# Patient Record
Sex: MALE | Race: WHITE | NOT HISPANIC OR LATINO | Employment: OTHER | URBAN - METROPOLITAN AREA
[De-identification: names, ages, dates, MRNs, and addresses within clinical notes are randomized per-mention and may not be internally consistent; named-entity substitution may affect disease eponyms.]

---

## 2018-05-10 LAB
ANION GAP SERPL CALCULATED.3IONS-SCNC: 17.8 MM/L
BUN SERPL-MCNC: 19 MG/DL (ref 7–25)
CALCIUM SERPL-MCNC: 9.7 MG/DL (ref 8.6–10.5)
CHLORIDE SERPL-SCNC: 106 MM/L (ref 98–107)
CHOLEST SERPL-MCNC: 122 MG/DL (ref 0–200)
CO2 SERPL-SCNC: 20 MM/L (ref 21–31)
CREAT SERPL-MCNC: 1.35 MG/DL (ref 0.7–1.3)
EGFR (HISTORICAL): 51 GFR
EGFR AFRICAN AMERICAN (HISTORICAL): > 60 GFR
EST. AVERAGE GLUCOSE BLD GHB EST-MCNC: 141 MG/DL
GLUCOSE (HISTORICAL): 109 MG/DL (ref 65–99)
HBA1C MFR BLD HPLC: 6.5 % (ref 4–6.2)
HDLC SERPL-MCNC: 50 MG/DL (ref 40–60)
LDLC SERPL CALC-MCNC: 48.8 MG/DL (ref 75–193)
OSMOLALITY, SERUM (HISTORICAL): 280 MOSM (ref 262–291)
POTASSIUM SERPL-SCNC: 4.8 MM/L (ref 3.5–5.5)
SODIUM SERPL-SCNC: 139 MM/L (ref 134–143)
TRIGL SERPL-MCNC: 114 MG/DL (ref 44–166)
VLDL CHOLESTEROL (HISTORICAL): 23 MG/DL (ref 5–51)

## 2018-07-19 DIAGNOSIS — E11.8 TYPE 2 DIABETES MELLITUS WITH COMPLICATION, WITH LONG-TERM CURRENT USE OF INSULIN (HCC): Primary | ICD-10-CM

## 2018-07-19 DIAGNOSIS — Z79.4 TYPE 2 DIABETES MELLITUS WITH COMPLICATION, WITH LONG-TERM CURRENT USE OF INSULIN (HCC): Primary | ICD-10-CM

## 2018-08-06 DIAGNOSIS — I10 HYPERTENSION, UNSPECIFIED TYPE: Primary | ICD-10-CM

## 2018-08-06 DIAGNOSIS — E78.5 HYPERLIPIDEMIA, UNSPECIFIED HYPERLIPIDEMIA TYPE: ICD-10-CM

## 2018-08-06 RX ORDER — SIMVASTATIN 20 MG
20 TABLET ORAL
Qty: 180 TABLET | Refills: 3 | Status: SHIPPED | OUTPATIENT
Start: 2018-08-06 | End: 2020-02-03 | Stop reason: SDUPTHER

## 2018-08-06 RX ORDER — AMLODIPINE BESYLATE 10 MG/1
10 TABLET ORAL DAILY
Qty: 180 TABLET | Refills: 3 | Status: SHIPPED | OUTPATIENT
Start: 2018-08-06 | End: 2020-02-03 | Stop reason: SDUPTHER

## 2018-08-07 PROBLEM — E11.9 DIABETES MELLITUS, TYPE II (HCC): Status: ACTIVE | Noted: 2018-08-07

## 2018-08-07 PROBLEM — I10 HYPERTENSION: Status: ACTIVE | Noted: 2018-08-07

## 2018-08-07 PROBLEM — E78.00 HIGH CHOLESTEROL: Status: ACTIVE | Noted: 2018-08-07

## 2018-08-07 RX ORDER — ASPIRIN 81 MG/1
81 TABLET ORAL DAILY
COMMUNITY

## 2018-08-07 RX ORDER — LISINOPRIL 20 MG/1
20 TABLET ORAL DAILY
COMMUNITY
End: 2019-04-03 | Stop reason: SDUPTHER

## 2019-01-14 DIAGNOSIS — Z01.00 DIABETIC EYE EXAM (HCC): Primary | ICD-10-CM

## 2019-01-14 DIAGNOSIS — E11.9 DIABETIC EYE EXAM (HCC): Primary | ICD-10-CM

## 2019-01-14 DIAGNOSIS — Z79.4 TYPE 2 DIABETES MELLITUS WITH COMPLICATION, WITH LONG-TERM CURRENT USE OF INSULIN (HCC): ICD-10-CM

## 2019-01-14 DIAGNOSIS — E11.8 TYPE 2 DIABETES MELLITUS WITH COMPLICATION, WITH LONG-TERM CURRENT USE OF INSULIN (HCC): ICD-10-CM

## 2019-01-15 ENCOUNTER — OFFICE VISIT (OUTPATIENT)
Dept: FAMILY MEDICINE CLINIC | Facility: CLINIC | Age: 84
End: 2019-01-15
Payer: MEDICARE

## 2019-01-15 VITALS
HEIGHT: 65 IN | TEMPERATURE: 99.2 F | SYSTOLIC BLOOD PRESSURE: 140 MMHG | HEART RATE: 84 BPM | BODY MASS INDEX: 25.66 KG/M2 | DIASTOLIC BLOOD PRESSURE: 72 MMHG | RESPIRATION RATE: 16 BRPM | OXYGEN SATURATION: 98 % | WEIGHT: 154 LBS

## 2019-01-15 DIAGNOSIS — I10 ESSENTIAL HYPERTENSION: Primary | ICD-10-CM

## 2019-01-15 DIAGNOSIS — E78.00 HIGH CHOLESTEROL: ICD-10-CM

## 2019-01-15 DIAGNOSIS — Z13.0 SCREENING, ANEMIA, DEFICIENCY, IRON: Primary | ICD-10-CM

## 2019-01-15 DIAGNOSIS — E11.9 TYPE 2 DIABETES MELLITUS WITHOUT COMPLICATION, WITHOUT LONG-TERM CURRENT USE OF INSULIN (HCC): ICD-10-CM

## 2019-01-15 DIAGNOSIS — R53.83 FATIGUE, UNSPECIFIED TYPE: ICD-10-CM

## 2019-01-15 PROCEDURE — 99214 OFFICE O/P EST MOD 30 MIN: CPT | Performed by: INTERNAL MEDICINE

## 2019-01-15 NOTE — PROGRESS NOTES
Assessment/Plan: This is a follow-up visit for 43-year-old male with background history of hypertension, type 2 diabetes with adequate control last A1c 6 2  Hyperlipidemia on statin therapy  History of mild kidney dysfunction class creatinine 1 35  Patient's medications were all reviewed and renewed  Will monitor CBC Chem 7 A1c and lipid profile urine for microalbumin  Discuss the need for flu and pneumonia immunization but patient refused  Discuss the need for follow-up colonoscopy but patient declined  Recheck and follow-up scheduled for 3         Diagnoses and all orders for this visit:    Essential hypertension  -     Basic metabolic panel; Future    High cholesterol  -     Lipid panel; Future    Type 2 diabetes mellitus without complication, without long-term current use of insulin (HCC)  -     Urinalysis with reflex to microscopic  -     CBC and differential; Future    Other orders  -     Cancel: C-peptide; Future  -     Cancel: Glucose, fasting; Future          Subjective:      Patient ID: Ludwin Rosales is a 82 y o  male  43-year-old male with background history of hypertension type 2 diabetes and hyperlipidemia  Patient comes after an absence of almost 9 months because he had moved to [place] for few months he is back now for a checkup  Patient is generally feeling fine he has no specific complaints denies any chest pain dyspnea or palpitation  Past history is negative for coronary artery disease  Colorectal screening was done 10 years ago and since then he has refused to go for follow-up  Patient had no new complaints at this time  The following portions of the patient's history were reviewed and updated as appropriate: He  has a past medical history of Aortic aneurysm ([facility] ); Back pain; Carotid stenosis; and Prostate cancer ([facility] )    Patient Active Problem List    Diagnosis Date Noted    Diabetes mellitus, type II ([facility] ) 08/07/2018    Hypertension 08/07/2018    High cholesterol 08/07/2018     He  has a past surgical history that includes Thoracic aortic aneurysm repair and Other surgical history (2005)  His family history includes Cancer in his family; Diabetes in his family  He  reports that he has never smoked  He has never used smokeless tobacco  He reports that he drinks alcohol  He reports that he does not use drugs  Current Outpatient Prescriptions   Medication Sig Dispense Refill    amLODIPine (NORVASC) 10 mg tablet Take 1 tablet (10 mg total) by mouth daily 180 tablet 3    aspirin (ECOTRIN LOW STRENGTH) 81 mg EC tablet Take 81 mg by mouth daily      CINNAMON PO Take by mouth daily      lisinopril (ZESTRIL) 20 mg tablet Take 20 mg by mouth daily      metFORMIN (GLUCOPHAGE) 500 mg tablet Take 1 tablet (500 mg total) by mouth 2 (two) times a day with meals 180 tablet 2    simvastatin (ZOCOR) 20 mg tablet Take 1 tablet (20 mg total) by mouth daily at bedtime 180 tablet 3     No current facility-administered medications for this visit  Current Outpatient Prescriptions on File Prior to Visit   Medication Sig    amLODIPine (NORVASC) 10 mg tablet Take 1 tablet (10 mg total) by mouth daily    aspirin (ECOTRIN LOW STRENGTH) 81 mg EC tablet Take 81 mg by mouth daily    CINNAMON PO Take by mouth daily    lisinopril (ZESTRIL) 20 mg tablet Take 20 mg by mouth daily    metFORMIN (GLUCOPHAGE) 500 mg tablet Take 1 tablet (500 mg total) by mouth 2 (two) times a day with meals    simvastatin (ZOCOR) 20 mg tablet Take 1 tablet (20 mg total) by mouth daily at bedtime     No current facility-administered medications on file prior to visit  He has No Known Allergies       Review of Systems   Constitutional: Negative  HENT: Negative  Eyes: Negative  Respiratory: Negative  Cardiovascular: Negative  Gastrointestinal: Negative  Endocrine: Negative  Genitourinary: Negative  Musculoskeletal: Negative  Skin: Negative      Allergic/Immunologic: Negative  Neurological: Negative  Hematological: Negative  Psychiatric/Behavioral: Negative  Objective:      /72 (BP Location: Right arm, Patient Position: Sitting, Cuff Size: Adult)   Pulse 84   Temp 99 2 °F (37 3 °C) (Tympanic)   Resp 16   Ht 5' 5" (1 651 m)   Wt 69 9 kg (154 lb)   SpO2 98%   BMI 25 63 kg/m²          Physical Exam   Constitutional: He is oriented to person, place, and time  He appears well-developed and well-nourished  HENT:   Head: Normocephalic  Eyes: Pupils are equal, round, and reactive to light  Neck: Normal range of motion  Neck supple  No JVD present  No tracheal deviation present  No thyromegaly present  Cardiovascular: Normal rate, regular rhythm, normal heart sounds and intact distal pulses  Exam reveals no gallop and no friction rub  No murmur heard  Pulmonary/Chest: Effort normal and breath sounds normal    Abdominal: Soft  Bowel sounds are normal    Musculoskeletal: Normal range of motion  Neurological: He is alert and oriented to person, place, and time  Skin: Skin is warm  No visits with results within 4 Month(s) from this visit  Latest known visit with results is:   Orders Only on 05/10/2018   Component Date Value Ref Range Status    Glucose 05/10/2018 109* 65 - 99 MG/DL Final    Comment: ADA fasting glucose recommendations:   Normal: 65-99; Impaired: 100-125;  Diagnostic for Diabetes mellitus: > 125; Target for Diabetes mellitus:       BUN 05/10/2018 19  7 - 25 MG/DL Final    Creatinine 05/10/2018 1 35* 0 7 - 1 3 MG/DL Final    Comment: IDMS method performed  The drugs Metamizole, Sulfasalazine, and Sulfapyridine may interfere and  result in false low results   Sodium 05/10/2018 139  134 - 143 MM/L Final    Please note: Reference range change based on patient population               Potassium 05/10/2018 4 8  3 5 - 5 5 MM/L Final    Please note: Reference range change based on patient population   Chloride 05/10/2018 106  98 - 107 MM/L Final    CO2 05/10/2018 20* 21 0 - 31 0 MM/L Final    Calcium 05/10/2018 9 7  8 6 - 10 5 MG/DL Final    Please note: Reference range change based on patient population   Anion Gap 05/10/2018 17 8  MM/L Final    OSMOLALITY, SERUM 05/10/2018 280  262 - 291 mOsm Final    EGFR (HISTORICAL) 05/10/2018 51* >60 GFR Final    Comment: This calculation follows the MDRD guidelines  Units are in mL/min/1 73 sq meters  Stage 3a  Reference Range -  45-59 mL/min  Mild to moderate decrease in kidney function      eGFR  05/10/2018 > 60  >60 GFR Final    Cholesterol 05/10/2018 122  0 - 200 MG/DL Final    Comment: <200----------------------------DESIRABLE  200 - 239---------------------BORDERLINE HIGH  240 OR GREATER-----HIGH  The drugs Metamizole, Sulfasalazine, and Sulfapyridine may interfere and  result in false low results   Triglycerides 05/10/2018 114  44 - 166 MG/DL Final    Comment: The drugs Metamizole, Sulfasalazine, and Sulfapyridine may interfere and  result in false low results   VLDL CHOLESTEROL 05/10/2018 23 0  5 - 51 MG/DL Final    HDL 05/10/2018 50  40 - 60 MG/DL Final    Comment: The drugs Metamizole, Sulfasalazine, and Sulfapyridine may interfere and  result in false low results   LDL Calculated 05/10/2018 48 8* 75 - 193 MG/DL Final    Hemoglobin A1C 05/10/2018 6 5* 4 0 - 6 2 % Final    Comment: >8% - POOR BLOOD GLUCOSE CONTROL   7% - GOOD BLOOD GLUCOSE CONTROL  <7% - EXCELLENT BLOOD GLUCOSE CONTROL      EAG 05/10/2018 141   Final       No results found

## 2019-04-03 DIAGNOSIS — I10 ESSENTIAL HYPERTENSION: Primary | ICD-10-CM

## 2019-04-03 RX ORDER — LISINOPRIL 20 MG/1
20 TABLET ORAL DAILY
Qty: 180 TABLET | Refills: 1 | Status: SHIPPED | OUTPATIENT
Start: 2019-04-03 | End: 2020-04-01 | Stop reason: SDUPTHER

## 2019-04-16 ENCOUNTER — OFFICE VISIT (OUTPATIENT)
Dept: FAMILY MEDICINE CLINIC | Facility: CLINIC | Age: 84
End: 2019-04-16
Payer: MEDICARE

## 2019-04-16 VITALS
HEART RATE: 80 BPM | BODY MASS INDEX: 25.43 KG/M2 | DIASTOLIC BLOOD PRESSURE: 80 MMHG | HEIGHT: 65 IN | RESPIRATION RATE: 18 BRPM | WEIGHT: 152.6 LBS | TEMPERATURE: 98.9 F | SYSTOLIC BLOOD PRESSURE: 162 MMHG | OXYGEN SATURATION: 98 %

## 2019-04-16 DIAGNOSIS — E78.00 HIGH CHOLESTEROL: ICD-10-CM

## 2019-04-16 DIAGNOSIS — Z00.01 ENCOUNTER FOR ANNUAL GENERAL MEDICAL EXAMINATION WITH ABNORMAL FINDINGS IN ADULT: Primary | ICD-10-CM

## 2019-04-16 DIAGNOSIS — I10 ESSENTIAL HYPERTENSION: ICD-10-CM

## 2019-04-16 DIAGNOSIS — E11.8 TYPE 2 DIABETES MELLITUS WITH COMPLICATION, WITHOUT LONG-TERM CURRENT USE OF INSULIN (HCC): ICD-10-CM

## 2019-04-16 PROCEDURE — 99213 OFFICE O/P EST LOW 20 MIN: CPT | Performed by: INTERNAL MEDICINE

## 2019-04-16 PROCEDURE — G0439 PPPS, SUBSEQ VISIT: HCPCS | Performed by: INTERNAL MEDICINE

## 2019-06-24 LAB
LEFT EYE DIABETIC RETINOPATHY: NORMAL
RIGHT EYE DIABETIC RETINOPATHY: NORMAL

## 2019-08-15 LAB — HBA1C MFR BLD HPLC: 6.6 %

## 2019-08-22 ENCOUNTER — OFFICE VISIT (OUTPATIENT)
Dept: FAMILY MEDICINE CLINIC | Facility: CLINIC | Age: 84
End: 2019-08-22
Payer: MEDICARE

## 2019-08-22 VITALS
BODY MASS INDEX: 25.33 KG/M2 | OXYGEN SATURATION: 98 % | TEMPERATURE: 99.3 F | HEART RATE: 85 BPM | RESPIRATION RATE: 18 BRPM | HEIGHT: 65 IN | WEIGHT: 152 LBS | DIASTOLIC BLOOD PRESSURE: 84 MMHG | SYSTOLIC BLOOD PRESSURE: 162 MMHG

## 2019-08-22 DIAGNOSIS — E11.8 TYPE 2 DIABETES MELLITUS WITH COMPLICATION, WITHOUT LONG-TERM CURRENT USE OF INSULIN (HCC): Primary | ICD-10-CM

## 2019-08-22 DIAGNOSIS — I10 ESSENTIAL HYPERTENSION: ICD-10-CM

## 2019-08-22 DIAGNOSIS — E66.3 OVERWEIGHT (BMI 25.0-29.9): ICD-10-CM

## 2019-08-22 PROCEDURE — 99214 OFFICE O/P EST MOD 30 MIN: CPT | Performed by: FAMILY MEDICINE

## 2019-08-22 NOTE — PROGRESS NOTES
Assessment/Plan:    No problem-specific Assessment & Plan notes found for this encounter  Diagnoses and all orders for this visit:    Type 2 diabetes mellitus with complication, without long-term current use of insulin (HCC)  -     Microalbumin / creatinine urine ratio  -     HEMOGLOBIN A1C W/ EAG ESTIMATION; Future  -     C-peptide; Future  -     Glucose, fasting; Future  -     Hemoglobin A1C; Future    Essential hypertension    Overweight (BMI 25 0-29  9)  Comments:  pt counseled on diet and exercise          PHQ-9 Depression Screening    PHQ-9:    Frequency of the following problems over the past two weeks:             BMI Counseling: Body mass index is 25 29 kg/m²  Discussed the patient's BMI with him  The BMI is above average  BMI counseling and education was provided to the patient  Nutrition recommendations include reducing portion sizes and 3-5 servings of fruits/vegetables daily  Exercise recommendations include moderate aerobic physical activity for 150 minutes/week and exercising 3-5 times per week  Subjective:      Patient ID: Anoop Timmons is a 82 y o  male  Follow up for HTN, pt is taking amlodipine and lisinopril    Diabetes   He presents for his follow-up diabetic visit  He has type 2 diabetes mellitus  His disease course has been stable  There are no hypoglycemic associated symptoms  There are no diabetic associated symptoms  Pertinent negatives for diabetes include no chest pain  There are no hypoglycemic complications  Risk factors for coronary artery disease include male sex and sedentary lifestyle  Current diabetic treatment includes oral agent (monotherapy)  He is compliant with treatment most of the time  His weight is stable  He is following a diabetic diet  (Pt is not checking BS at home) An ACE inhibitor/angiotensin II receptor blocker is being taken         The following portions of the patient's history were reviewed and updated as appropriate: allergies, current medications, past family history, past medical history, past social history, past surgical history and problem list     Review of Systems   Eyes: Negative for visual disturbance  Cardiovascular: Negative for chest pain and palpitations  Gastrointestinal: Negative for abdominal pain, nausea and vomiting  Genitourinary: Positive for frequency  Skin: Negative for wound  Neurological: Negative for numbness  Objective:    /84   Pulse 85   Temp 99 3 °F (37 4 °C)   Resp 18   Ht 5' 5" (1 651 m)   Wt 68 9 kg (152 lb)   SpO2 98%   BMI 25 29 kg/m²      Physical Exam   Constitutional: He is oriented to person, place, and time  He appears well-developed and well-nourished  HENT:   Head: Normocephalic and atraumatic  Eyes: Pupils are equal, round, and reactive to light  Conjunctivae and EOM are normal  No scleral icterus  Neck: Normal range of motion  Neck supple  Cardiovascular: Normal rate, regular rhythm and normal heart sounds  No murmur heard  Pulmonary/Chest: Effort normal and breath sounds normal  No respiratory distress  He has no wheezes  He has no rales  Abdominal: Soft  Bowel sounds are normal  He exhibits no distension and no mass  There is no tenderness  There is no rebound and no guarding  Musculoskeletal: He exhibits no edema or deformity  Lymphadenopathy:     He has no cervical adenopathy  Neurological: He is alert and oriented to person, place, and time  Skin: Skin is warm and dry  Psychiatric: He has a normal mood and affect  His behavior is normal  Judgment and thought content normal    Nursing note and vitals reviewed

## 2019-10-11 DIAGNOSIS — Z79.4 TYPE 2 DIABETES MELLITUS WITH COMPLICATION, WITH LONG-TERM CURRENT USE OF INSULIN (HCC): ICD-10-CM

## 2019-10-11 DIAGNOSIS — E11.8 TYPE 2 DIABETES MELLITUS WITH COMPLICATION, WITH LONG-TERM CURRENT USE OF INSULIN (HCC): ICD-10-CM

## 2019-11-14 LAB — HBA1C MFR BLD HPLC: 6.4 %

## 2019-11-21 ENCOUNTER — OFFICE VISIT (OUTPATIENT)
Dept: FAMILY MEDICINE CLINIC | Facility: CLINIC | Age: 84
End: 2019-11-21
Payer: MEDICARE

## 2019-11-21 VITALS
DIASTOLIC BLOOD PRESSURE: 85 MMHG | HEIGHT: 65 IN | WEIGHT: 152 LBS | BODY MASS INDEX: 25.33 KG/M2 | TEMPERATURE: 98.8 F | RESPIRATION RATE: 20 BRPM | HEART RATE: 84 BPM | OXYGEN SATURATION: 98 % | SYSTOLIC BLOOD PRESSURE: 138 MMHG

## 2019-11-21 DIAGNOSIS — Z12.5 SCREENING FOR PROSTATE CANCER: ICD-10-CM

## 2019-11-21 DIAGNOSIS — E78.00 HYPERCHOLESTEROLEMIA: ICD-10-CM

## 2019-11-21 DIAGNOSIS — E11.9 TYPE 2 DIABETES MELLITUS WITHOUT COMPLICATION, WITHOUT LONG-TERM CURRENT USE OF INSULIN (HCC): Primary | ICD-10-CM

## 2019-11-21 DIAGNOSIS — I10 ESSENTIAL HYPERTENSION: ICD-10-CM

## 2019-11-21 PROCEDURE — 99214 OFFICE O/P EST MOD 30 MIN: CPT | Performed by: FAMILY MEDICINE

## 2019-11-21 NOTE — PROGRESS NOTES
Assessment/Plan:    No problem-specific Assessment & Plan notes found for this encounter  Diagnoses and all orders for this visit:    Type 2 diabetes mellitus without complication, without long-term current use of insulin (HCC)  -     HEMOGLOBIN A1C W/ EAG ESTIMATION; Future  -     C-peptide; Future  -     Glucose, fasting; Future    Essential hypertension  -     CBC and differential; Future  -     TSH, 3rd generation with Free T4 reflex; Future    Screening for prostate cancer  -     PSA, Total Screen; Future    Hypercholesterolemia  -     Comprehensive metabolic panel; Future  -     Lipid panel; Future    Other orders  -     Cancel: Hemoglobin A1C; Future          PHQ-9 Depression Screening    PHQ-9:    Frequency of the following problems over the past two weeks:                 Subjective:      Patient ID: Jessica Eubanks is a 82 y o  male  Follow up for up HTN, pt is checking Bps at home and seeing good numbers    Diabetes   He presents for his follow-up diabetic visit  He has type 2 diabetes mellitus  His disease course has been stable  There are no hypoglycemic associated symptoms  There are no diabetic associated symptoms  Pertinent negatives for diabetes include no chest pain  There are no hypoglycemic complications  Symptoms are stable  There are no diabetic complications  Current diabetic treatment includes oral agent (monotherapy)  He is compliant with treatment most of the time  The following portions of the patient's history were reviewed and updated as appropriate: allergies, current medications, past family history, past medical history, past social history, past surgical history and problem list     Review of Systems   Eyes: Negative for visual disturbance  Cardiovascular: Negative for chest pain and palpitations  Gastrointestinal: Negative for abdominal pain, nausea and vomiting  Genitourinary: Negative for frequency  Skin: Negative for wound     Neurological: Negative for numbness  Objective:    /85   Pulse 84   Temp 98 8 °F (37 1 °C) (Tympanic)   Resp 20   Ht 5' 5" (1 651 m)   Wt 68 9 kg (152 lb)   SpO2 98%   BMI 25 29 kg/m²      Physical Exam   Constitutional: He is oriented to person, place, and time  He appears well-developed and well-nourished  HENT:   Head: Normocephalic and atraumatic  Eyes: Pupils are equal, round, and reactive to light  Conjunctivae and EOM are normal  No scleral icterus  Neck: Normal range of motion  Neck supple  Cardiovascular: Normal rate, regular rhythm and normal heart sounds  No murmur heard  Pulmonary/Chest: Effort normal and breath sounds normal  No respiratory distress  He has no wheezes  He has no rales  Abdominal: Soft  Bowel sounds are normal  He exhibits no distension and no mass  There is no tenderness  There is no rebound and no guarding  Musculoskeletal: He exhibits no edema or deformity  Lymphadenopathy:     He has no cervical adenopathy  Neurological: He is alert and oriented to person, place, and time  Skin: Skin is warm and dry  Psychiatric: He has a normal mood and affect  His behavior is normal  Judgment and thought content normal    Nursing note and vitals reviewed

## 2020-02-03 DIAGNOSIS — I10 HYPERTENSION, UNSPECIFIED TYPE: ICD-10-CM

## 2020-02-03 DIAGNOSIS — E78.5 HYPERLIPIDEMIA, UNSPECIFIED HYPERLIPIDEMIA TYPE: ICD-10-CM

## 2020-02-05 RX ORDER — AMLODIPINE BESYLATE 10 MG/1
10 TABLET ORAL DAILY
Qty: 90 TABLET | Refills: 1 | Status: SHIPPED | OUTPATIENT
Start: 2020-02-05 | End: 2020-08-03

## 2020-02-05 RX ORDER — SIMVASTATIN 20 MG
20 TABLET ORAL
Qty: 90 TABLET | Refills: 1 | Status: SHIPPED | OUTPATIENT
Start: 2020-02-05 | End: 2020-08-03

## 2020-04-01 DIAGNOSIS — I10 ESSENTIAL HYPERTENSION: ICD-10-CM

## 2020-04-02 RX ORDER — LISINOPRIL 20 MG/1
20 TABLET ORAL DAILY
Qty: 180 TABLET | Refills: 1 | Status: SHIPPED | OUTPATIENT
Start: 2020-04-02 | End: 2020-09-30 | Stop reason: SDUPTHER

## 2020-04-23 LAB — HBA1C MFR BLD HPLC: 6.6 %

## 2020-06-18 ENCOUNTER — OFFICE VISIT (OUTPATIENT)
Dept: FAMILY MEDICINE CLINIC | Facility: CLINIC | Age: 85
End: 2020-06-18
Payer: MEDICARE

## 2020-06-18 VITALS
TEMPERATURE: 98.5 F | DIASTOLIC BLOOD PRESSURE: 68 MMHG | HEIGHT: 65 IN | BODY MASS INDEX: 25.33 KG/M2 | OXYGEN SATURATION: 95 % | SYSTOLIC BLOOD PRESSURE: 138 MMHG | HEART RATE: 71 BPM | WEIGHT: 152 LBS | RESPIRATION RATE: 18 BRPM

## 2020-06-18 DIAGNOSIS — Z13.31 NEGATIVE DEPRESSION SCREENING: ICD-10-CM

## 2020-06-18 DIAGNOSIS — E66.3 OVERWEIGHT: ICD-10-CM

## 2020-06-18 DIAGNOSIS — I10 ESSENTIAL HYPERTENSION: ICD-10-CM

## 2020-06-18 DIAGNOSIS — E78.00 HYPERCHOLESTEROLEMIA: ICD-10-CM

## 2020-06-18 DIAGNOSIS — Z00.00 MEDICARE ANNUAL WELLNESS VISIT, SUBSEQUENT: Primary | ICD-10-CM

## 2020-06-18 DIAGNOSIS — E11.9 TYPE 2 DIABETES MELLITUS WITHOUT COMPLICATION, WITHOUT LONG-TERM CURRENT USE OF INSULIN (HCC): ICD-10-CM

## 2020-06-18 PROCEDURE — 99214 OFFICE O/P EST MOD 30 MIN: CPT | Performed by: FAMILY MEDICINE

## 2020-06-18 PROCEDURE — 3078F DIAST BP <80 MM HG: CPT | Performed by: FAMILY MEDICINE

## 2020-06-18 PROCEDURE — 2022F DILAT RTA XM EVC RTNOPTHY: CPT | Performed by: FAMILY MEDICINE

## 2020-06-18 PROCEDURE — 1036F TOBACCO NON-USER: CPT | Performed by: FAMILY MEDICINE

## 2020-06-18 PROCEDURE — 1125F AMNT PAIN NOTED PAIN PRSNT: CPT | Performed by: FAMILY MEDICINE

## 2020-06-18 PROCEDURE — 3008F BODY MASS INDEX DOCD: CPT | Performed by: FAMILY MEDICINE

## 2020-06-18 PROCEDURE — 3044F HG A1C LEVEL LT 7.0%: CPT | Performed by: FAMILY MEDICINE

## 2020-06-18 PROCEDURE — 1160F RVW MEDS BY RX/DR IN RCRD: CPT | Performed by: FAMILY MEDICINE

## 2020-06-18 PROCEDURE — 3075F SYST BP GE 130 - 139MM HG: CPT | Performed by: FAMILY MEDICINE

## 2020-06-18 PROCEDURE — G0439 PPPS, SUBSEQ VISIT: HCPCS | Performed by: FAMILY MEDICINE

## 2020-06-18 PROCEDURE — 1170F FXNL STATUS ASSESSED: CPT | Performed by: FAMILY MEDICINE

## 2020-07-07 DIAGNOSIS — E11.8 TYPE 2 DIABETES MELLITUS WITH COMPLICATION, WITH LONG-TERM CURRENT USE OF INSULIN (HCC): ICD-10-CM

## 2020-07-07 DIAGNOSIS — Z79.4 TYPE 2 DIABETES MELLITUS WITH COMPLICATION, WITH LONG-TERM CURRENT USE OF INSULIN (HCC): ICD-10-CM

## 2020-08-03 DIAGNOSIS — E78.5 HYPERLIPIDEMIA, UNSPECIFIED HYPERLIPIDEMIA TYPE: ICD-10-CM

## 2020-08-03 DIAGNOSIS — I10 HYPERTENSION, UNSPECIFIED TYPE: ICD-10-CM

## 2020-08-03 RX ORDER — AMLODIPINE BESYLATE 10 MG/1
TABLET ORAL
Qty: 90 TABLET | Refills: 0 | Status: SHIPPED | OUTPATIENT
Start: 2020-08-03 | End: 2020-11-03 | Stop reason: SDUPTHER

## 2020-08-03 RX ORDER — SIMVASTATIN 20 MG
TABLET ORAL
Qty: 90 TABLET | Refills: 0 | Status: SHIPPED | OUTPATIENT
Start: 2020-08-03 | End: 2020-11-03 | Stop reason: SDUPTHER

## 2020-09-30 DIAGNOSIS — I10 ESSENTIAL HYPERTENSION: ICD-10-CM

## 2020-10-01 RX ORDER — LISINOPRIL 20 MG/1
20 TABLET ORAL DAILY
Qty: 180 TABLET | Refills: 1 | Status: SHIPPED | OUTPATIENT
Start: 2020-10-01 | End: 2021-09-21 | Stop reason: SDUPTHER

## 2020-11-03 DIAGNOSIS — I10 HYPERTENSION, UNSPECIFIED TYPE: ICD-10-CM

## 2020-11-03 DIAGNOSIS — E78.5 HYPERLIPIDEMIA, UNSPECIFIED HYPERLIPIDEMIA TYPE: ICD-10-CM

## 2020-11-03 RX ORDER — AMLODIPINE BESYLATE 10 MG/1
10 TABLET ORAL DAILY
Qty: 90 TABLET | Refills: 3 | Status: SHIPPED | OUTPATIENT
Start: 2020-11-03 | End: 2021-11-01 | Stop reason: SDUPTHER

## 2020-11-03 RX ORDER — SIMVASTATIN 20 MG
20 TABLET ORAL
Qty: 90 TABLET | Refills: 3 | Status: SHIPPED | OUTPATIENT
Start: 2020-11-03 | End: 2021-11-01 | Stop reason: SDUPTHER

## 2020-11-10 LAB
LEFT EYE DIABETIC RETINOPATHY: NORMAL
RIGHT EYE DIABETIC RETINOPATHY: NORMAL

## 2020-12-30 ENCOUNTER — LAB (OUTPATIENT)
Dept: LAB | Facility: CLINIC | Age: 85
End: 2020-12-30
Payer: MEDICARE

## 2020-12-30 DIAGNOSIS — E11.9 TYPE 2 DIABETES MELLITUS WITHOUT COMPLICATION, WITHOUT LONG-TERM CURRENT USE OF INSULIN (HCC): ICD-10-CM

## 2020-12-30 LAB
EST. AVERAGE GLUCOSE BLD GHB EST-MCNC: 134 MG/DL
GLUCOSE P FAST SERPL-MCNC: 128 MG/DL (ref 65–99)
HBA1C MFR BLD: 6.3 %

## 2020-12-30 PROCEDURE — 84681 ASSAY OF C-PEPTIDE: CPT

## 2020-12-30 PROCEDURE — 82947 ASSAY GLUCOSE BLOOD QUANT: CPT

## 2020-12-30 PROCEDURE — 36415 COLL VENOUS BLD VENIPUNCTURE: CPT

## 2020-12-30 PROCEDURE — 83036 HEMOGLOBIN GLYCOSYLATED A1C: CPT

## 2020-12-31 LAB — C PEPTIDE SERPL-MCNC: 4.2 NG/ML (ref 1.1–4.4)

## 2021-01-07 ENCOUNTER — OFFICE VISIT (OUTPATIENT)
Dept: FAMILY MEDICINE CLINIC | Facility: CLINIC | Age: 86
End: 2021-01-07
Payer: MEDICARE

## 2021-01-07 VITALS
SYSTOLIC BLOOD PRESSURE: 138 MMHG | BODY MASS INDEX: 25.79 KG/M2 | TEMPERATURE: 97.9 F | WEIGHT: 154.8 LBS | HEIGHT: 65 IN | OXYGEN SATURATION: 95 % | DIASTOLIC BLOOD PRESSURE: 78 MMHG | HEART RATE: 104 BPM

## 2021-01-07 DIAGNOSIS — E78.00 HIGH CHOLESTEROL: ICD-10-CM

## 2021-01-07 DIAGNOSIS — Z12.5 SCREENING FOR PROSTATE CANCER: ICD-10-CM

## 2021-01-07 DIAGNOSIS — E11.9 ENCOUNTER FOR DIABETIC FOOT EXAM (HCC): ICD-10-CM

## 2021-01-07 DIAGNOSIS — E66.3 OVERWEIGHT (BMI 25.0-29.9): ICD-10-CM

## 2021-01-07 DIAGNOSIS — Z13.31 NEGATIVE DEPRESSION SCREENING: ICD-10-CM

## 2021-01-07 DIAGNOSIS — E11.9 TYPE 2 DIABETES MELLITUS WITHOUT COMPLICATION, WITHOUT LONG-TERM CURRENT USE OF INSULIN (HCC): Primary | ICD-10-CM

## 2021-01-07 DIAGNOSIS — I10 ESSENTIAL HYPERTENSION: ICD-10-CM

## 2021-01-07 DIAGNOSIS — Z23 ENCOUNTER FOR IMMUNIZATION: ICD-10-CM

## 2021-01-07 PROCEDURE — 99214 OFFICE O/P EST MOD 30 MIN: CPT | Performed by: FAMILY MEDICINE

## 2021-01-07 NOTE — PROGRESS NOTES
Assessment/Plan:    No problem-specific Assessment & Plan notes found for this encounter  Diagnoses and all orders for this visit:    Type 2 diabetes mellitus without complication, without long-term current use of insulin ([facility])  Comments:  well controlled  Orders:  -     Glucose, fasting; Future  -     Hemoglobin A1C; Future    Essential hypertension  Comments:  no change in the medication  Orders:  -     CBC and differential; Future  -     TSH, 3rd generation with Free T4 reflex; Future    [facility]ounter [facility] exam [facility] )  -     Diabetic foot exam; Future    Negative depression screening    Overweight (BMI 25 0-29  9)    Encounter for immunization  -     PNEUMOCOCCAL CONJUGATE VACCINE 13-VALENT GREATER THAN 6 MONTHS    Screening for prostate cancer  -     PSA, Total Screen; Future    High cholesterol  -     Comprehensive metabolic panel; Future  -     Lipid panel; Future    Other orders  -     Cancel: influenza vaccine, high-dose, PF 0 7 mL (FLUZONE HIGH-DOSE)          PHQ-9 Depression Screening    PHQ-9:   Frequency of the following problems over the past two weeks:      Little interest or pleasure in doing things: 0 - not at all  Feeling down, depressed, or hopeless: 0 - not at all  PHQ-2 Score: 0        BMI Counseling: Body mass index is 25 76 kg/m²  The BMI is above normal  Nutrition recommendations include reducing portion sizes and 3-5 servings of fruits/vegetables daily  Exercise recommendations include moderate aerobic physical activity for 150 minutes/week and exercising 3-5 times per week  Subjective:      Patient ID: Jared Rodriguez is a 82 y o  male  Follow up for HTN    Diabetes  He presents for his follow-up diabetic visit  He has type 2 diabetes mellitus  His disease course has been stable  There are no hypoglycemic associated symptoms  There are no diabetic associated symptoms  Pertinent negatives for diabetes include no chest pain  Symptoms are stable         The following portions of the patient's history were reviewed and updated as appropriate: allergies, current medications, past family history, past medical history, past social history, past surgical history and problem list     Review of Systems   Eyes: Negative for visual disturbance  Cardiovascular: Negative for chest pain and palpitations  Gastrointestinal: Negative for abdominal pain, nausea and vomiting  Genitourinary: Positive for frequency  Skin: Negative for wound  Neurological: Negative for numbness  Objective:    /78   Pulse 104   Temp 97 9 °F (36 6 °C)   Ht 5' 5" (1 651 m)   Wt 70 2 kg (154 lb 12 8 oz)   SpO2 95%   BMI 25 76 kg/m²      Physical Exam  Vitals signs and nursing note reviewed  Constitutional:       General: He is not in acute distress  Appearance: Normal appearance  He is well-developed  He is not ill-appearing, toxic-appearing or diaphoretic  HENT:      Head: Normocephalic and atraumatic  Right Ear: External ear normal       Left Ear: External ear normal       Nose: Nose normal       Mouth/Throat:      Mouth: Mucous membranes are moist    Eyes:      General: No scleral icterus  Conjunctiva/sclera: Conjunctivae normal       Pupils: Pupils are equal, round, and reactive to light  Neck:      Musculoskeletal: Normal range of motion and neck supple  No neck rigidity  Cardiovascular:      Rate and Rhythm: Normal rate and regular rhythm  Pulses: Normal pulses  no weak pulses          Dorsalis pedis pulses are 2+ on the right side and 2+ on the left side  Heart sounds: Normal heart sounds  No murmur  Pulmonary:      Effort: Pulmonary effort is normal  No respiratory distress  Breath sounds: Normal breath sounds  No wheezing or rales  Abdominal:      General: Bowel sounds are normal  There is no distension  Palpations: Abdomen is soft  There is no mass  Tenderness: There is no abdominal tenderness  There is no guarding or rebound  Musculoskeletal:         General: No deformity  Right lower leg: No edema  Left lower leg: No edema  Feet:      Right foot:      Skin integrity: No ulcer, skin breakdown, erythema, warmth, callus or dry skin  Left foot:      Skin integrity: No ulcer, skin breakdown, erythema, warmth, callus or dry skin  Lymphadenopathy:      Cervical: No cervical adenopathy  Skin:     General: Skin is warm and dry  Neurological:      General: No focal deficit present  Mental Status: He is alert and oriented to person, place, and time  Sensory: No sensory deficit  Motor: No weakness  Coordination: Coordination normal       Gait: Gait normal    Psychiatric:         Mood and Affect: Mood normal          Behavior: Behavior normal          Thought Content: Thought content normal          Judgment: Judgment normal        Patient's shoes and socks removed  Right Foot/Ankle   Right Foot Inspection  Skin Exam: skin normal and skin intact no dry skin, no warmth, no callus, no erythema, no maceration, no abnormal color, no pre-ulcer, no ulcer and no callus                          Toe Exam:  no right toe deformity  Sensory       Monofilament testing: intact  Vascular  Capillary refills: elevated  The right DP pulse is 2+  Left Foot/Ankle  Left Foot Inspection  Skin Exam: skin normal and skin intactno dry skin, no warmth, no erythema, no maceration, normal color, no pre-ulcer, no ulcer and no callus                         Toe Exam: no left toe deformity                   Sensory       Monofilament: intact  Vascular  Capillary refills: elevated  The left DP pulse is 2+  Assign Risk Category:  No deformity present; No loss of protective sensation;  No weak pulses       Risk: 0

## 2021-04-06 DIAGNOSIS — E11.8 TYPE 2 DIABETES MELLITUS WITH COMPLICATION, WITH LONG-TERM CURRENT USE OF INSULIN (HCC): ICD-10-CM

## 2021-04-06 DIAGNOSIS — Z79.4 TYPE 2 DIABETES MELLITUS WITH COMPLICATION, WITH LONG-TERM CURRENT USE OF INSULIN (HCC): ICD-10-CM

## 2021-07-12 ENCOUNTER — APPOINTMENT (OUTPATIENT)
Dept: LAB | Facility: CLINIC | Age: 86
End: 2021-07-12
Payer: MEDICARE

## 2021-07-12 DIAGNOSIS — E11.9 TYPE 2 DIABETES MELLITUS WITHOUT COMPLICATION, WITHOUT LONG-TERM CURRENT USE OF INSULIN (HCC): ICD-10-CM

## 2021-07-12 DIAGNOSIS — E78.00 HIGH CHOLESTEROL: ICD-10-CM

## 2021-07-12 DIAGNOSIS — Z12.5 SCREENING FOR PROSTATE CANCER: ICD-10-CM

## 2021-07-12 DIAGNOSIS — I10 ESSENTIAL HYPERTENSION: ICD-10-CM

## 2021-07-12 LAB
ALBUMIN SERPL BCP-MCNC: 3.7 G/DL (ref 3.5–5)
ALP SERPL-CCNC: 53 U/L (ref 46–116)
ALT SERPL W P-5'-P-CCNC: 20 U/L (ref 12–78)
ANION GAP SERPL CALCULATED.3IONS-SCNC: 11 MMOL/L (ref 4–13)
AST SERPL W P-5'-P-CCNC: 18 U/L (ref 5–45)
BASOPHILS # BLD AUTO: 0.05 THOUSANDS/ΜL (ref 0–0.1)
BASOPHILS NFR BLD AUTO: 1 % (ref 0–1)
BILIRUB SERPL-MCNC: 0.92 MG/DL (ref 0.2–1)
BUN SERPL-MCNC: 24 MG/DL (ref 5–25)
CALCIUM SERPL-MCNC: 9.5 MG/DL (ref 8.3–10.1)
CHLORIDE SERPL-SCNC: 108 MMOL/L (ref 100–108)
CHOLEST SERPL-MCNC: 99 MG/DL (ref 50–200)
CO2 SERPL-SCNC: 18 MMOL/L (ref 21–32)
CREAT SERPL-MCNC: 1.54 MG/DL (ref 0.6–1.3)
EOSINOPHIL # BLD AUTO: 0.11 THOUSAND/ΜL (ref 0–0.61)
EOSINOPHIL NFR BLD AUTO: 1 % (ref 0–6)
ERYTHROCYTE [DISTWIDTH] IN BLOOD BY AUTOMATED COUNT: 15.5 % (ref 11.6–15.1)
EST. AVERAGE GLUCOSE BLD GHB EST-MCNC: 137 MG/DL
GFR SERPL CREATININE-BSD FRML MDRD: 41 ML/MIN/1.73SQ M
GLUCOSE P FAST SERPL-MCNC: 119 MG/DL (ref 65–99)
HBA1C MFR BLD: 6.4 %
HCT VFR BLD AUTO: 39.8 % (ref 36.5–49.3)
HDLC SERPL-MCNC: 49 MG/DL
HGB BLD-MCNC: 12.1 G/DL (ref 12–17)
IMM GRANULOCYTES # BLD AUTO: 0.06 THOUSAND/UL (ref 0–0.2)
IMM GRANULOCYTES NFR BLD AUTO: 1 % (ref 0–2)
LDLC SERPL CALC-MCNC: 29 MG/DL (ref 0–100)
LYMPHOCYTES # BLD AUTO: 2.05 THOUSANDS/ΜL (ref 0.6–4.47)
LYMPHOCYTES NFR BLD AUTO: 25 % (ref 14–44)
MCH RBC QN AUTO: 22.2 PG (ref 26.8–34.3)
MCHC RBC AUTO-ENTMCNC: 30.4 G/DL (ref 31.4–37.4)
MCV RBC AUTO: 73 FL (ref 82–98)
MONOCYTES # BLD AUTO: 0.7 THOUSAND/ΜL (ref 0.17–1.22)
MONOCYTES NFR BLD AUTO: 9 % (ref 4–12)
NEUTROPHILS # BLD AUTO: 5.29 THOUSANDS/ΜL (ref 1.85–7.62)
NEUTS SEG NFR BLD AUTO: 63 % (ref 43–75)
NONHDLC SERPL-MCNC: 50 MG/DL
NRBC BLD AUTO-RTO: 0 /100 WBCS
PLATELET # BLD AUTO: 322 THOUSANDS/UL (ref 149–390)
PMV BLD AUTO: 10.3 FL (ref 8.9–12.7)
POTASSIUM SERPL-SCNC: 4.2 MMOL/L (ref 3.5–5.3)
PROT SERPL-MCNC: 7.5 G/DL (ref 6.4–8.2)
PSA SERPL-MCNC: 0.3 NG/ML (ref 0–4)
RBC # BLD AUTO: 5.44 MILLION/UL (ref 3.88–5.62)
SODIUM SERPL-SCNC: 137 MMOL/L (ref 136–145)
TRIGL SERPL-MCNC: 107 MG/DL
TSH SERPL DL<=0.05 MIU/L-ACNC: 2.76 UIU/ML (ref 0.36–3.74)
WBC # BLD AUTO: 8.26 THOUSAND/UL (ref 4.31–10.16)

## 2021-07-12 PROCEDURE — 80061 LIPID PANEL: CPT

## 2021-07-12 PROCEDURE — 36415 COLL VENOUS BLD VENIPUNCTURE: CPT

## 2021-07-12 PROCEDURE — 85025 COMPLETE CBC W/AUTO DIFF WBC: CPT

## 2021-07-12 PROCEDURE — G0103 PSA SCREENING: HCPCS

## 2021-07-12 PROCEDURE — 83036 HEMOGLOBIN GLYCOSYLATED A1C: CPT

## 2021-07-12 PROCEDURE — 84443 ASSAY THYROID STIM HORMONE: CPT

## 2021-07-12 PROCEDURE — 80053 COMPREHEN METABOLIC PANEL: CPT

## 2021-07-22 ENCOUNTER — OFFICE VISIT (OUTPATIENT)
Dept: FAMILY MEDICINE CLINIC | Facility: CLINIC | Age: 86
End: 2021-07-22
Payer: MEDICARE

## 2021-07-22 VITALS
DIASTOLIC BLOOD PRESSURE: 58 MMHG | OXYGEN SATURATION: 98 % | SYSTOLIC BLOOD PRESSURE: 128 MMHG | WEIGHT: 150.5 LBS | TEMPERATURE: 98.4 F | BODY MASS INDEX: 25.08 KG/M2 | HEART RATE: 84 BPM | HEIGHT: 65 IN

## 2021-07-22 DIAGNOSIS — Z00.00 ENCOUNTER FOR SUBSEQUENT ANNUAL WELLNESS VISIT (AWV) IN MEDICARE PATIENT: Primary | ICD-10-CM

## 2021-07-22 DIAGNOSIS — E78.00 HIGH CHOLESTEROL: ICD-10-CM

## 2021-07-22 DIAGNOSIS — N18.32 CHRONIC KIDNEY DISEASE (CKD) STAGE G3B/A1, MODERATELY DECREASED GLOMERULAR FILTRATION RATE (GFR) BETWEEN 30-44 ML/MIN/1.73 SQUARE METER AND ALBUMINURIA CREATININE RATIO LESS THAN 30 MG/G (HCC): ICD-10-CM

## 2021-07-22 DIAGNOSIS — E66.3 OVERWEIGHT (BMI 25.0-29.9): ICD-10-CM

## 2021-07-22 DIAGNOSIS — R71.8 RBC MICROCYTOSIS: ICD-10-CM

## 2021-07-22 DIAGNOSIS — I10 ESSENTIAL HYPERTENSION: ICD-10-CM

## 2021-07-22 DIAGNOSIS — E11.9 TYPE 2 DIABETES MELLITUS WITHOUT COMPLICATION, WITHOUT LONG-TERM CURRENT USE OF INSULIN (HCC): ICD-10-CM

## 2021-07-22 PROCEDURE — 99214 OFFICE O/P EST MOD 30 MIN: CPT | Performed by: FAMILY MEDICINE

## 2021-07-22 PROCEDURE — G0439 PPPS, SUBSEQ VISIT: HCPCS | Performed by: FAMILY MEDICINE

## 2021-07-22 PROCEDURE — 1123F ACP DISCUSS/DSCN MKR DOCD: CPT | Performed by: FAMILY MEDICINE

## 2021-07-22 NOTE — PROGRESS NOTES
Assessment/Plan:    No problem-specific Assessment & Plan notes found for this encounter  Diagnoses and all orders for this visit:    Encounter for subsequent annual wellness visit (AWV) in Medicare patient    Type 2 diabetes mellitus without complication, without long-term current use of insulin (HCC)  Comments:  Stable, no change in medications  Orders:  -     CBC and differential; Future  -     Comprehensive metabolic panel; Future  -     Hemoglobin A1C; Future    Essential hypertension  Comments: Well controlled  Orders:  -     CBC and differential; Future  -     Comprehensive metabolic panel; Future    High cholesterol    Overweight (BMI 25 0-29  9)    RBC microcytosis  Comments:  check iron studies  Orders:  -     Iron Panel (Includes Ferritin, Iron Sat%, Iron, and TIBC); Future    Chronic kidney disease (CKD) stage 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/A1, moderately decreased glomerular filtration rate (GFR) between 30-44 mL/min/1 73 square meter and albuminuria creatinine ratio less than 30 mg/g (HCC)  Comments:  Unclear what his baseline renal function is, will reassess at next visit in 3m          PHQ-9 Depression Screening    PHQ-9:   Frequency of the following problems over the past two weeks:      Little interest or pleasure in doing things: 0 - not at all  Feeling down, depressed, or hopeless: 0 - not at all  PHQ-2 Score: 0        BMI Counseling: Body mass index is 25 04 kg/m²  The BMI is above normal  Nutrition recommendations include reducing portion sizes, decreasing overall calorie intake, 3-5 servings of fruits/vegetables daily, reducing fast food intake, consuming healthier snacks, decreasing soda and/or juice intake and moderation in carbohydrate intake  Exercise recommendations include vigorous aerobic physical activity for 75 minutes/week and exercising 3-5 times per week  Subjective:      Patient ID: Eual Sacks is a 82 y o  male  Patient presents for laboratory review and follow-up of chronic conditions  He is doing well overall with his diabetes, his A1c is 6 4 and fasting blood sugar 119  His PSA is normal at 0 3, his thyroid functions are normal   His total cholesterol is 99, triglycerides 107, HDL 49, LDL 29 -  These numbers are excellent  He does have a normal hemoglobin and hematocrit however his MCV is low at 73  His CMP is normal with the exception of a decreased renal function with a creatinine of 1 54, his GFR is 41      The following portions of the patient's history were reviewed and updated as appropriate: allergies, current medications, past family history, past medical history, past social history, past surgical history and problem list     Review of Systems   Constitutional: Negative  Negative for chills, fatigue and fever  HENT: Negative  Negative for hearing loss  Eyes: Negative  Negative for visual disturbance  Respiratory: Negative for shortness of breath and wheezing  Cardiovascular: Negative for chest pain and palpitations  Gastrointestinal: Negative for abdominal pain, blood in stool, constipation, diarrhea, nausea and vomiting  Endocrine: Negative  Genitourinary: Negative for decreased urine volume, difficulty urinating, dysuria, frequency, hematuria and urgency  Musculoskeletal: Negative for arthralgias and myalgias  Skin: Negative  Allergic/Immunologic: Negative  Neurological: Negative for dizziness, seizures, syncope, weakness, light-headedness and headaches  Hematological: Negative for adenopathy  Psychiatric/Behavioral: Negative  Negative for dysphoric mood  The patient is not nervous/anxious  Objective:    /58 (BP Location: Left arm, Patient Position: Sitting, Cuff Size: Standard)   Pulse 84   Temp 98 4 °F (36 9 °C) (Tympanic)   Ht 5' 5" (1 651 m)   Wt 68 3 kg (150 lb 8 oz)   SpO2 98%   BMI 25 04 kg/m²      Physical Exam  Vitals and nursing note reviewed  Constitutional:       General: He is not in acute distress  Appearance: Normal appearance  He is well-developed  He is not ill-appearing, toxic-appearing or diaphoretic  HENT:      Head: Normocephalic and atraumatic  Right Ear: Tympanic membrane, ear canal and external ear normal  There is no impacted cerumen  Left Ear: Tympanic membrane, ear canal and external ear normal  There is no impacted cerumen  Nose: Nose normal  No congestion or rhinorrhea  Mouth/Throat:      Mouth: Mucous membranes are moist       Pharynx: Oropharynx is clear  No oropharyngeal exudate or posterior oropharyngeal erythema  Eyes:      General: No scleral icterus  Right eye: No discharge  Left eye: No discharge  Extraocular Movements: Extraocular movements intact  Conjunctiva/sclera: Conjunctivae normal       Pupils: Pupils are equal, round, and reactive to light  Cardiovascular:      Rate and Rhythm: Normal rate and regular rhythm  Pulses: Normal pulses  Heart sounds: Normal heart sounds  No murmur heard  No friction rub  No gallop  Pulmonary:      Effort: Pulmonary effort is normal  No respiratory distress  Breath sounds: Normal breath sounds  No wheezing, rhonchi or rales  Abdominal:      General: Bowel sounds are normal  There is no distension  Palpations: Abdomen is soft  There is no mass  Tenderness: There is no abdominal tenderness  There is no guarding or rebound  Musculoskeletal:         General: No swelling or deformity  Normal range of motion  Cervical back: Normal range of motion and neck supple  No rigidity  Right lower leg: No edema  Left lower leg: No edema  Lymphadenopathy:      Cervical: No cervical adenopathy  Skin:     General: Skin is warm and dry  Findings: No rash  Neurological:      General: No focal deficit present  Mental Status: He is alert and oriented to person, place, and time  Sensory: No sensory deficit  Motor: No weakness        Coordination: Coordination normal       Gait: Gait normal       Deep Tendon Reflexes: Reflexes are normal and symmetric  Reflexes normal    Psychiatric:         Mood and Affect: Mood normal          Behavior: Behavior normal          Thought Content:  Thought content normal          Judgment: Judgment normal

## 2021-07-22 NOTE — PROGRESS NOTES
Assessment and Plan:     Problem List Items Addressed This Visit     None           Preventive health issues were discussed with patient, and age appropriate screening tests were ordered as noted in patient's After Visit Summary  Personalized health advice and appropriate referrals for health education or preventive services given if needed, as noted in patient's After Visit Summary  History of Present Illness:     Patient presents for Medicare Annual Wellness visit    Patient Care Team:  Maria Guadalupe Casas DO as PCP - General (Family Medicine)     Problem List:     Patient Active Problem List   Diagnosis    Type 2 diabetes mellitus without complication, without long-term current use of insulin ([facility] )    Essential hypertension    High cholesterol    Overweight (BMI 25 0-29  9)    Negative depression screening    Encounter for diabetic foot exam [facility])      Past Medical and Surgical History:     Past Medical History:   Diagnosis Date    Aortic aneurysm ([facility] )     Back pain     Carotid stenosis     Prostate cancer [facility])      Past Surgical History:   Procedure Laterality Date    OTHER SURGICAL HISTORY  2005    prostate cancer S/P    THORACIC AORTIC ANEURYSM REPAIR        Family History:     Family History   Problem Relation Age of Onset    Cancer Family     Diabetes Family       Social History:     Social History     Socioeconomic History    Marital status: /Civil Union     Spouse name: None    Number of children: None    Years of education: None    Highest education level: None   Occupational History    None   Tobacco Use    Smoking status: Never Smoker    Smokeless tobacco: Never Used   Substance and Sexual Activity    Alcohol use: Yes    Drug use: No    Sexual activity: None   Other Topics Concern    None   Social History Narrative    Influenza 10/7/11 was refused       Social Determinants of Health     Financial Resource Strain:     Difficulty of Paying Living Expenses:    Food Insecurity:     Worried About Running Out of Food in the Last Year:     [address] in the Last Year:    Transportation Needs:     Lack of Transportation (Medical):  Lack of Transportation (Non-Medical):    Physical Activity:     Days of Exercise per Week:     Minutes of Exercise per Session:    Stress:     Feeling of Stress :    Social Connections:     Frequency of Communication with Friends and Family:     Frequency of Social Gatherings with Friends and Family:     Attends Voodoo Services:     Active Member of Clubs or Organizations:     Attends Club or Organization Meetings:     Marital Status:    Intimate Partner Violence:     Fear of Current or Ex-Partner:     Emotionally Abused:     Physically Abused:     Sexually Abused:       Medications and Allergies:     Current Outpatient Medications   Medication Sig Dispense Refill    amLODIPine (NORVASC) 10 mg tablet Take 1 tablet (10 mg total) by mouth daily 90 tablet 3    aspirin (ECOTRIN LOW STRENGTH) 81 mg EC tablet Take 81 mg by mouth daily      CINNAMON PO Take by mouth daily      lisinopril (ZESTRIL) 20 mg tablet Take 1 tablet (20 mg total) by mouth daily 180 tablet 1    metFORMIN (GLUCOPHAGE) 500 mg tablet Take 1 tablet (500 mg total) by mouth 2 (two) times a day with meals 180 tablet 2    simvastatin (ZOCOR) 20 mg tablet Take 1 tablet (20 mg total) by mouth daily at bedtime 90 tablet 3     No current facility-administered medications for this visit  No Known Allergies   Immunizations:     Immunization History   Administered Date(s) Administered    INFLUENZA 10/07/2011      Health Maintenance: There are no preventive care reminders to display for this patient        Topic Date Due    Pneumococcal Vaccine: 65+ Years (1 of 2 - PPSV23) Never done    COVID-19 Vaccine (1) Never done    DTaP,Tdap,and Td Vaccines (1 - Tdap) Never done    Influenza Vaccine (1) 09/01/2021      Medicare Health Risk Assessment:     /58 (BP Location: Left arm, Patient Position: Sitting, Cuff Size: Standard)   Pulse 84   Temp 98 4 °F (36 9 °C) (Tympanic)   Ht 5' 5" (1 651 m)   Wt 68 3 kg (150 lb 8 oz)   SpO2 98%   BMI 25 04 kg/m²      Ynes Fields is here for his Subsequent Wellness visit  Last Medicare Wellness visit information reviewed, patient interviewed and updates made to the record today  Health Risk Assessment:   Patient rates overall health as fair  Patient feels that their physical health rating is same  Patient is very satisfied with their life  Eyesight was rated as same  Hearing was rated as slightly worse  Patient feels that their emotional and mental health rating is same  Patients states they are never, rarely angry  Patient states they are sometimes unusually tired/fatigued  Pain experienced in the last 7 days has been none  Patient states that he has experienced no weight loss or gain in last 6 months  Depression Screening:   PHQ-2 Score: 0      Fall Risk Screening: In the past year, patient has experienced: no history of falling in past year      Home Safety:  Patient does not have trouble with stairs inside or outside of their home  Patient has working smoke alarms and has working carbon monoxide detector  Home safety hazards include: none  Nutrition:   Current diet is Regular  Medications:   Patient is currently taking over-the-counter supplements  OTC medications include: see medication list  Patient is able to manage medications  Activities of Daily Living (ADLs)/Instrumental Activities of Daily Living (IADLs):   Walk and transfer into and out of bed and chair?: Yes  Dress and groom yourself?: Yes    Bathe or shower yourself?: Yes    Feed yourself?  Yes  Do your laundry/housekeeping?: Yes  Manage your money, pay your bills and track your expenses?: Yes  Make your own meals?: Yes    Do your own shopping?: Yes    Previous Hospitalizations:   Any hospitalizations or ED visits within the last 12 months?: No Advance Care Planning:   Living will: No    Durable POA for healthcare: Yes    Advanced directive: No    Five wishes given: Yes    End of Life Decisions reviewed with patient: Yes    Provider agrees with end of life decisions: Yes      Comments: Patient is in the process of finalizing living will and has POA    PREVENTIVE SCREENINGS      Cardiovascular Screening:    General: Screening Not Indicated and History Lipid Disorder      Diabetes Screening:     General: Screening Not Indicated and History Diabetes      Colorectal Cancer Screening:     General: Screening Not Indicated      Prostate Cancer Screening:    General: History Prostate Cancer and Screening Not Indicated      Osteoporosis Screening:    General: Screening Not Indicated      Abdominal Aortic Aneurysm (AAA) Screening:        General: Screening Not Indicated      Lung Cancer Screening:     General: Screening Not Indicated    Screening, Brief Intervention, and Referral to Treatment (SBIRT)    Screening  Typical number of drinks in a day: 0  Typical number of drinks in a week: 4  Interpretation: Low risk drinking behavior      Single Item Drug Screening:  How often have you used an illegal drug (including marijuana) or a prescription medication for non-medical reasons in the past year? never    Single Item Drug Screen Score: 0  Interpretation: Negative screen for possible drug use disorder      Desirae Burnette, DO

## 2021-08-24 PROBLEM — N18.32 CHRONIC KIDNEY DISEASE (CKD) STAGE G3B/A1, MODERATELY DECREASED GLOMERULAR FILTRATION RATE (GFR) BETWEEN 30-44 ML/MIN/1.73 SQUARE METER AND ALBUMINURIA CREATININE RATIO LESS THAN 30 MG/G (HCC): Status: ACTIVE | Noted: 2021-08-24

## 2021-09-21 DIAGNOSIS — I10 ESSENTIAL HYPERTENSION: ICD-10-CM

## 2021-09-21 RX ORDER — LISINOPRIL 20 MG/1
20 TABLET ORAL DAILY
Qty: 180 TABLET | Refills: 1 | Status: SHIPPED | OUTPATIENT
Start: 2021-09-21 | End: 2022-08-08 | Stop reason: DRUGHIGH

## 2021-11-01 DIAGNOSIS — I10 HYPERTENSION, UNSPECIFIED TYPE: ICD-10-CM

## 2021-11-01 DIAGNOSIS — E78.5 HYPERLIPIDEMIA, UNSPECIFIED HYPERLIPIDEMIA TYPE: ICD-10-CM

## 2021-11-03 RX ORDER — AMLODIPINE BESYLATE 10 MG/1
10 TABLET ORAL DAILY
Qty: 90 TABLET | Refills: 3 | Status: SHIPPED | OUTPATIENT
Start: 2021-11-03

## 2021-11-03 RX ORDER — SIMVASTATIN 20 MG
20 TABLET ORAL
Qty: 90 TABLET | Refills: 3 | Status: SHIPPED | OUTPATIENT
Start: 2021-11-03

## 2021-12-30 DIAGNOSIS — E11.8 TYPE 2 DIABETES MELLITUS WITH COMPLICATION, WITH LONG-TERM CURRENT USE OF INSULIN (HCC): ICD-10-CM

## 2021-12-30 DIAGNOSIS — Z79.4 TYPE 2 DIABETES MELLITUS WITH COMPLICATION, WITH LONG-TERM CURRENT USE OF INSULIN (HCC): ICD-10-CM

## 2022-01-03 DIAGNOSIS — E11.8 TYPE 2 DIABETES MELLITUS WITH COMPLICATION, WITH LONG-TERM CURRENT USE OF INSULIN (HCC): ICD-10-CM

## 2022-01-03 DIAGNOSIS — Z79.4 TYPE 2 DIABETES MELLITUS WITH COMPLICATION, WITH LONG-TERM CURRENT USE OF INSULIN (HCC): ICD-10-CM

## 2022-01-27 ENCOUNTER — OFFICE VISIT (OUTPATIENT)
Dept: FAMILY MEDICINE CLINIC | Facility: CLINIC | Age: 87
End: 2022-01-27
Payer: MEDICARE

## 2022-01-27 VITALS
HEIGHT: 65 IN | HEART RATE: 87 BPM | DIASTOLIC BLOOD PRESSURE: 72 MMHG | OXYGEN SATURATION: 97 % | WEIGHT: 152.2 LBS | BODY MASS INDEX: 25.36 KG/M2 | SYSTOLIC BLOOD PRESSURE: 138 MMHG | TEMPERATURE: 97.8 F

## 2022-01-27 DIAGNOSIS — Z23 ENCOUNTER FOR IMMUNIZATION: ICD-10-CM

## 2022-01-27 DIAGNOSIS — Z12.5 SCREENING FOR PROSTATE CANCER: ICD-10-CM

## 2022-01-27 DIAGNOSIS — I10 ESSENTIAL HYPERTENSION: ICD-10-CM

## 2022-01-27 DIAGNOSIS — E11.9 TYPE 2 DIABETES MELLITUS WITHOUT COMPLICATION, WITHOUT LONG-TERM CURRENT USE OF INSULIN (HCC): Primary | ICD-10-CM

## 2022-01-27 DIAGNOSIS — E78.00 HYPERCHOLESTEROLEMIA: ICD-10-CM

## 2022-01-27 LAB — SL AMB POCT HEMOGLOBIN AIC: 6.1 (ref ?–6.5)

## 2022-01-27 PROCEDURE — 83036 HEMOGLOBIN GLYCOSYLATED A1C: CPT | Performed by: FAMILY MEDICINE

## 2022-01-27 PROCEDURE — 99214 OFFICE O/P EST MOD 30 MIN: CPT | Performed by: FAMILY MEDICINE

## 2022-01-27 NOTE — PROGRESS NOTES
Assessment/Plan:    No problem-specific Assessment & Plan notes found for this encounter  Diagnoses and all orders for this visit:    Type 2 diabetes mellitus without complication, without long-term current use of insulin (HCC)  Comments:  no change in the medication  Orders:  -     Diabetic foot exam; Future  -     Hemoglobin A1C; Future  -     POCT hemoglobin A1c    Essential hypertension  Comments:  no change in the medication  Orders:  -     CBC and differential; Future  -     Comprehensive metabolic panel; Future  -     TSH, 3rd generation with Free T4 reflex; Future    Encounter for immunization  -     PNEUMOCOCCAL CONJUGATE VACCINE 13-VALENT GREATER THAN 6 MONTHS    Screening for prostate cancer  -     PSA, Total Screen; Future    Hypercholesterolemia  Comments:  pt counseled on diet and exercise  Orders:  -     Comprehensive metabolic panel; Future  -     Lipid panel; Future          PHQ-2/9 Depression Screening    Little interest or pleasure in doing things: 0 - not at all  Feeling down, depressed, or hopeless: 0 - not at all  PHQ-2 Score: 0  PHQ-2 Interpretation: Negative depression screen            Subjective:      Patient ID: Jermaine Elmore is a 82 y o  male  Diabetes  He presents for his follow-up diabetic visit  He has type 2 diabetes mellitus  His disease course has been stable  There are no diabetic associated symptoms  Pertinent negatives for diabetes include no chest pain  There are no known risk factors for coronary artery disease  Current diabetic treatment includes oral agent (monotherapy)  He is compliant with treatment most of the time  His weight is stable  He is following a diabetic diet  An ACE inhibitor/angiotensin II receptor blocker is being taken  Hypertension  This is a chronic problem  The current episode started more than 1 year ago  The problem is unchanged  The problem is controlled  Pertinent negatives include no chest pain or palpitations         The following portions of the patient's history were reviewed and updated as appropriate: allergies, current medications, past family history, past medical history, past social history, past surgical history and problem list     Review of Systems   Eyes: Negative for visual disturbance  Cardiovascular: Negative for chest pain and palpitations  Gastrointestinal: Negative for abdominal pain, nausea and vomiting  Genitourinary: Negative for frequency  Skin: Negative for wound  Neurological: Negative for numbness  Objective:    /72 (BP Location: Left arm, Patient Position: Sitting)   Pulse 87   Temp 97 8 °F (36 6 °C) (Tympanic)   Ht 5' 5" (1 651 m)   Wt 69 kg (152 lb 3 2 oz)   SpO2 97%   BMI 25 33 kg/m²      Physical Exam  Vitals and nursing note reviewed  Constitutional:       General: He is not in acute distress  Appearance: Normal appearance  He is not ill-appearing or diaphoretic  HENT:      Head: Normocephalic and atraumatic  Mouth/Throat:      Mouth: Mucous membranes are moist    Eyes:      Conjunctiva/sclera: Conjunctivae normal    Cardiovascular:      Rate and Rhythm: Normal rate and regular rhythm  Pulses: no weak pulses          Dorsalis pedis pulses are 2+ on the right side and 2+ on the left side  Heart sounds: Normal heart sounds  No murmur heard  Pulmonary:      Effort: Pulmonary effort is normal  No respiratory distress  Breath sounds: Normal breath sounds  No wheezing, rhonchi or rales  Abdominal:      General: There is no distension  Palpations: Abdomen is soft  There is no mass  Tenderness: There is no abdominal tenderness  There is no guarding or rebound  Musculoskeletal:      Cervical back: Normal range of motion  No rigidity  Right lower leg: No edema  Left lower leg: No edema  Feet:      Right foot:      Skin integrity: No ulcer, skin breakdown, erythema, warmth, callus or dry skin        Left foot:      Skin integrity: No ulcer, skin breakdown, erythema, warmth, callus or dry skin  Lymphadenopathy:      Cervical: No cervical adenopathy  Neurological:      Mental Status: He is alert and oriented to person, place, and time  Psychiatric:         Mood and Affect: Mood normal          Behavior: Behavior normal          Thought Content: Thought content normal          Judgment: Judgment normal        Patient's shoes and socks removed  Right Foot/Ankle   Right Foot Inspection  Skin Exam: skin normal and skin intact  No dry skin, no warmth, no callus, no erythema, no maceration, no abnormal color, no pre-ulcer, no ulcer and no callus  Toe Exam: ROM and strength within normal limits  no right toe deformity    Sensory   Monofilament testing: intact    Vascular  Capillary refills: < 3 seconds  The right DP pulse is 2+  Left Foot/Ankle  Left Foot Inspection  Skin Exam: skin normal and skin intact  No dry skin, no warmth, no erythema, no maceration, normal color, no pre-ulcer, no ulcer and no callus  Toe Exam: ROM and strength within normal limits  No left toe deformity  Sensory   Monofilament testing: intact    Vascular  Capillary refills: < 3 seconds  The left DP pulse is 2+       Assign Risk Category  No deformity present  No loss of protective sensation  No weak pulses  Risk: 0

## 2022-07-20 ENCOUNTER — APPOINTMENT (OUTPATIENT)
Dept: LAB | Facility: CLINIC | Age: 87
End: 2022-07-20
Payer: MEDICARE

## 2022-07-20 DIAGNOSIS — E11.9 TYPE 2 DIABETES MELLITUS WITHOUT COMPLICATION, WITHOUT LONG-TERM CURRENT USE OF INSULIN (HCC): ICD-10-CM

## 2022-07-20 DIAGNOSIS — E78.00 HYPERCHOLESTEROLEMIA: ICD-10-CM

## 2022-07-20 DIAGNOSIS — Z12.5 SCREENING FOR PROSTATE CANCER: ICD-10-CM

## 2022-07-20 DIAGNOSIS — I10 ESSENTIAL HYPERTENSION: ICD-10-CM

## 2022-07-20 LAB
ALBUMIN SERPL BCP-MCNC: 3.9 G/DL (ref 3.5–5)
ALP SERPL-CCNC: 54 U/L (ref 46–116)
ALT SERPL W P-5'-P-CCNC: 19 U/L (ref 12–78)
ANION GAP SERPL CALCULATED.3IONS-SCNC: 12 MMOL/L (ref 4–13)
AST SERPL W P-5'-P-CCNC: 15 U/L (ref 5–45)
BASOPHILS # BLD AUTO: 0.07 THOUSANDS/ΜL (ref 0–0.1)
BASOPHILS NFR BLD AUTO: 1 % (ref 0–1)
BILIRUB SERPL-MCNC: 0.87 MG/DL (ref 0.2–1)
BUN SERPL-MCNC: 26 MG/DL (ref 5–25)
CALCIUM SERPL-MCNC: 9 MG/DL (ref 8.3–10.1)
CHLORIDE SERPL-SCNC: 108 MMOL/L (ref 96–108)
CHOLEST SERPL-MCNC: 96 MG/DL
CO2 SERPL-SCNC: 19 MMOL/L (ref 21–32)
CREAT SERPL-MCNC: 1.49 MG/DL (ref 0.6–1.3)
EOSINOPHIL # BLD AUTO: 0.16 THOUSAND/ΜL (ref 0–0.61)
EOSINOPHIL NFR BLD AUTO: 2 % (ref 0–6)
ERYTHROCYTE [DISTWIDTH] IN BLOOD BY AUTOMATED COUNT: 16.4 % (ref 11.6–15.1)
EST. AVERAGE GLUCOSE BLD GHB EST-MCNC: 134 MG/DL
GFR SERPL CREATININE-BSD FRML MDRD: 42 ML/MIN/1.73SQ M
GLUCOSE P FAST SERPL-MCNC: 121 MG/DL (ref 65–99)
HBA1C MFR BLD: 6.3 %
HCT VFR BLD AUTO: 41 % (ref 36.5–49.3)
HDLC SERPL-MCNC: 51 MG/DL
HGB BLD-MCNC: 12.4 G/DL (ref 12–17)
IMM GRANULOCYTES # BLD AUTO: 0.05 THOUSAND/UL (ref 0–0.2)
IMM GRANULOCYTES NFR BLD AUTO: 1 % (ref 0–2)
LDLC SERPL CALC-MCNC: 28 MG/DL (ref 0–100)
LYMPHOCYTES # BLD AUTO: 2.4 THOUSANDS/ΜL (ref 0.6–4.47)
LYMPHOCYTES NFR BLD AUTO: 26 % (ref 14–44)
MCH RBC QN AUTO: 22.6 PG (ref 26.8–34.3)
MCHC RBC AUTO-ENTMCNC: 30.2 G/DL (ref 31.4–37.4)
MCV RBC AUTO: 75 FL (ref 82–98)
MONOCYTES # BLD AUTO: 0.8 THOUSAND/ΜL (ref 0.17–1.22)
MONOCYTES NFR BLD AUTO: 9 % (ref 4–12)
NEUTROPHILS # BLD AUTO: 5.81 THOUSANDS/ΜL (ref 1.85–7.62)
NEUTS SEG NFR BLD AUTO: 61 % (ref 43–75)
NONHDLC SERPL-MCNC: 45 MG/DL
NRBC BLD AUTO-RTO: 0 /100 WBCS
PLATELET # BLD AUTO: 327 THOUSANDS/UL (ref 149–390)
PMV BLD AUTO: 10 FL (ref 8.9–12.7)
POTASSIUM SERPL-SCNC: 4.5 MMOL/L (ref 3.5–5.3)
PROT SERPL-MCNC: 7.6 G/DL (ref 6.4–8.4)
PSA SERPL-MCNC: 0.2 NG/ML (ref 0–4)
RBC # BLD AUTO: 5.48 MILLION/UL (ref 3.88–5.62)
SODIUM SERPL-SCNC: 139 MMOL/L (ref 135–147)
TRIGL SERPL-MCNC: 84 MG/DL
TSH SERPL DL<=0.05 MIU/L-ACNC: 3.01 UIU/ML (ref 0.45–4.5)
WBC # BLD AUTO: 9.29 THOUSAND/UL (ref 4.31–10.16)

## 2022-07-20 PROCEDURE — 36415 COLL VENOUS BLD VENIPUNCTURE: CPT

## 2022-07-20 PROCEDURE — 85025 COMPLETE CBC W/AUTO DIFF WBC: CPT

## 2022-07-20 PROCEDURE — 83036 HEMOGLOBIN GLYCOSYLATED A1C: CPT

## 2022-07-20 PROCEDURE — G0103 PSA SCREENING: HCPCS

## 2022-07-20 PROCEDURE — 80053 COMPREHEN METABOLIC PANEL: CPT

## 2022-07-20 PROCEDURE — 84443 ASSAY THYROID STIM HORMONE: CPT

## 2022-07-20 PROCEDURE — 80061 LIPID PANEL: CPT

## 2022-07-28 ENCOUNTER — OFFICE VISIT (OUTPATIENT)
Dept: FAMILY MEDICINE CLINIC | Facility: CLINIC | Age: 87
End: 2022-07-28
Payer: MEDICARE

## 2022-07-28 VITALS
WEIGHT: 147 LBS | OXYGEN SATURATION: 95 % | TEMPERATURE: 98.9 F | HEART RATE: 103 BPM | BODY MASS INDEX: 24.49 KG/M2 | DIASTOLIC BLOOD PRESSURE: 60 MMHG | SYSTOLIC BLOOD PRESSURE: 128 MMHG | HEIGHT: 65 IN

## 2022-07-28 DIAGNOSIS — Z23 ENCOUNTER FOR IMMUNIZATION: ICD-10-CM

## 2022-07-28 DIAGNOSIS — N18.32 CHRONIC KIDNEY DISEASE (CKD) STAGE G3B/A1, MODERATELY DECREASED GLOMERULAR FILTRATION RATE (GFR) BETWEEN 30-44 ML/MIN/1.73 SQUARE METER AND ALBUMINURIA CREATININE RATIO LESS THAN 30 MG/G (HCC): ICD-10-CM

## 2022-07-28 DIAGNOSIS — Z00.00 MEDICARE ANNUAL WELLNESS VISIT, SUBSEQUENT: Primary | ICD-10-CM

## 2022-07-28 DIAGNOSIS — E78.00 HYPERCHOLESTEROLEMIA: ICD-10-CM

## 2022-07-28 DIAGNOSIS — E11.9 TYPE 2 DIABETES MELLITUS WITHOUT COMPLICATION, WITHOUT LONG-TERM CURRENT USE OF INSULIN (HCC): ICD-10-CM

## 2022-07-28 DIAGNOSIS — I10 ESSENTIAL HYPERTENSION: ICD-10-CM

## 2022-07-28 PROCEDURE — G0438 PPPS, INITIAL VISIT: HCPCS | Performed by: FAMILY MEDICINE

## 2022-07-28 PROCEDURE — 99214 OFFICE O/P EST MOD 30 MIN: CPT | Performed by: FAMILY MEDICINE

## 2022-07-28 NOTE — PROGRESS NOTES
Assessment and Plan:     Problem List Items Addressed This Visit        Endocrine    Type 2 diabetes mellitus without complication, without long-term current use of insulin ([facility] [address] )       Cardiovascular and Mediastinum    Essential hypertension       Genitourinary    Chronic kidney disease (CKD) stage G3b/A1, moderately decreased glomerular filtration rate (GFR) between 30-44 mL/min/1 73 square meter and albuminuria creatinine ratio less than 30 mg/g (HCC)       Other    Hypercholesterolemia      Other Visit Diagnoses     Medicare annual wellness visit, subsequent    -  Primary    Encounter for immunization               Preventive health issues were discussed with patient, and age appropriate screening tests were ordered as noted in patient's After Visit Summary  Personalized health advice and appropriate referrals for health education or preventive services given if needed, as noted in patient's After Visit Summary  History of Present Illness:     Patient presents for a Medicare Wellness Visit    HPI   Patient Care Team:  Josie Johnston DO as PCP - General (Family Medicine)     Review of Systems:     Review of Systems     Problem List:     Patient Active Problem List   Diagnosis    Type 2 diabetes mellitus without complication, without long-term current use of insulin ([facility] [address] )    Essential hypertension    Hypercholesterolemia    Overweight (BMI 25 0-29  9)    Negative depression screening    Encounter for diabetic foot exam ([facility] )    Encounter for subsequent annual wellness visit (AWV) in Medicare patient    RBC microcytosis    Chronic kidney disease (CKD) stage G3b/A1, moderately decreased glomerular filtration rate (GFR) between 30-44 mL/min/1 73 square meter and albuminuria creatinine ratio less than 30 mg/g [facility])      Past Medical and Surgical History:     Past Medical History:   Diagnosis Date    Aortic aneurysm (HCC)     Back pain     Carotid stenosis     Prostate cancer [facility])      Past Surgical History:   Procedure Laterality Date    OTHER SURGICAL HISTORY  2005    prostate cancer S/P    THORACIC AORTIC ANEURYSM REPAIR        Family History:     Family History   Problem Relation Age of Onset    Cancer Family     Diabetes Family       Social History:     Social History     Socioeconomic History    Marital status: /Civil Union     Spouse name: None    Number of children: None    Years of education: None    Highest education level: None   Occupational History    None   Tobacco Use    Smoking status: Never Smoker    Smokeless tobacco: Never Used   Substance and Sexual Activity    Alcohol use: Yes    Drug use: No    Sexual activity: None   Other Topics Concern    None   Social History Narrative    Influenza 10/7/11 was refused  Social Determinants of Health     Financial Resource Strain: Not on file   Food Insecurity: Not on file   Transportation Needs: Not on file   Physical Activity: Not on file   Stress: Not on file   Social Connections: Not on file   Intimate Partner Violence: Not on file   Housing Stability: Not on file      Medications and Allergies:     Current Outpatient Medications   Medication Sig Dispense Refill    amLODIPine (NORVASC) 10 mg tablet Take 1 tablet (10 mg total) by mouth daily 90 tablet 3    aspirin (ECOTRIN LOW STRENGTH) 81 mg EC tablet Take 81 mg by mouth daily      CINNAMON PO Take by mouth daily      lisinopril (ZESTRIL) 20 mg tablet Take 1 tablet (20 mg total) by mouth daily 180 tablet 1    metFORMIN (GLUCOPHAGE) 500 mg tablet Take 1 tablet (500 mg total) by mouth 2 (two) times a day with meals 180 tablet 2    simvastatin (ZOCOR) 20 mg tablet Take 1 tablet (20 mg total) by mouth daily at bedtime 90 tablet 3     No current facility-administered medications for this visit  No Known Allergies   Immunizations:     Immunization History   Administered Date(s) Administered    INFLUENZA 10/07/2011      Health Maintenance:      There are no preventive care reminders to display for this patient  Topic Date Due    COVID-19 Vaccine (1) Never done    Pneumococcal Vaccine: 65+ Years (1 - PCV) Never done    Influenza Vaccine (1) 09/01/2022      Medicare Screening Tests and Risk Assessments:     Mookie Mills is here for his Subsequent Wellness visit  Last Medicare Wellness visit information reviewed, patient interviewed and updates made to the record today  Health Risk Assessment:   Patient rates overall health as very good  Patient feels that their physical health rating is same  Patient is very satisfied with their life  Eyesight was rated as same  Hearing was rated as slightly worse  Patient feels that their emotional and mental health rating is same  Patients states they are never, rarely angry  Patient states they are sometimes unusually tired/fatigued  Pain experienced in the last 7 days has been none  Patient states that he has experienced no weight loss or gain in last 6 months  Depression Screening:   PHQ-2 Score: 0      Fall Risk Screening: In the past year, patient has experienced: no history of falling in past year      Home Safety:  Patient does not have trouble with stairs inside or outside of their home  Patient has working smoke alarms and has working carbon monoxide detector  Home safety hazards include: none  Nutrition:   Current diet is Regular  Medications:   Patient is currently taking over-the-counter supplements  OTC medications include: see medication list  Patient is able to manage medications  Activities of Daily Living (ADLs)/Instrumental Activities of Daily Living (IADLs):   Walk and transfer into and out of bed and chair?: Yes  Dress and groom yourself?: Yes    Bathe or shower yourself?: Yes    Feed yourself?  Yes  Do your laundry/housekeeping?: Yes  Manage your money, pay your bills and track your expenses?: Yes  Make your own meals?: Yes    Do your own shopping?: Yes    Previous Hospitalizations:   Any hospitalizations or ED visits within the last 12 months?: No      Advance Care Planning:   Living will: No    Durable POA for healthcare: No    Advanced directive: No    Advanced directive counseling given: Yes    Five wishes given: Yes    Patient declined ACP directive: No    End of Life Decisions reviewed with patient: Yes    Provider agrees with end of life decisions: No      Cognitive Screening:   Provider or family/friend/caregiver concerned regarding cognition?: No    PREVENTIVE SCREENINGS      Cardiovascular Screening:    General: Screening Not Indicated and History Lipid Disorder      Diabetes Screening:     General: Screening Not Indicated and History Diabetes      Colorectal Cancer Screening:     General: Screening Not Indicated      Prostate Cancer Screening:    General: History Prostate Cancer and Screening Not Indicated      Osteoporosis Screening:    General: Screening Not Indicated      Abdominal Aortic Aneurysm (AAA) Screening:        General: Screening Not Indicated      Lung Cancer Screening:     General: Screening Not Indicated      Hepatitis C Screening:    General: Screening Not Indicated    Screening, Brief Intervention, and Referral to Treatment (SBIRT)    Screening  Typical number of drinks in a day: 2  Typical number of drinks in a week: 14  Interpretation: Low risk drinking behavior      Single Item Drug Screening:  How often have you used an illegal drug (including marijuana) or a prescription medication for non-medical reasons in the past year? never    Single Item Drug Screen Score: 0  Interpretation: Negative screen for possible drug use disorder    No exam data present     Physical Exam:     /60 (BP Location: Left arm, Patient Position: Sitting)   Pulse 103   Temp 98 9 °F (37 2 °C) (Tympanic)   Ht 5' 5" (1 651 m)   Wt 66 7 kg (147 lb)   SpO2 95%   BMI 24 46 kg/m²     Physical Exam     Sonia Soto DO

## 2022-07-28 NOTE — PROGRESS NOTES
Assessment/Plan:    No problem-specific Assessment & Plan notes found for this encounter  Diagnoses and all orders for this visit:    Medicare annual wellness visit, subsequent    Type 2 diabetes mellitus without complication, without long-term current use of insulin ([facility] [address] )  Comments:  no change in the medication  Orders:  -     Glucose, fasting; Future  -     Hemoglobin A1C; Future    Essential hypertension  Comments:  no change in the medication    Hypercholesterolemia  Comments:  pt counseled on diet and exercise    Encounter for immunization    Chronic kidney disease (CKD) stage G3b/A1, moderately decreased glomerular filtration rate (GFR) between 30-44 mL/min/1 73 square meter and albuminuria creatinine ratio less than 30 mg/g ([facility])  -     Ambulatory Referral to Nephrology; Future          PHQ-2/9 Depression Screening    Little interest or pleasure in doing things: 0 - not at all  Feeling down, depressed, or hopeless: 0 - not at all  PHQ-2 Score: 0  PHQ-2 Interpretation: Negative depression screen            Subjective:      Patient ID: Ashley Cheng is a 82 y o  male  Diabetes  He presents for his follow-up diabetic visit  He has type 2 diabetes mellitus  His disease course has been stable  Pertinent negatives for hypoglycemia include no seizures  Pertinent negatives for diabetes include no chest pain and no fatigue  Hypertension  This is a chronic problem  The current episode started more than 1 year ago  The problem is unchanged  The problem is controlled  Pertinent negatives include no chest pain, palpitations or shortness of breath  The following portions of the patient's history were reviewed and updated as appropriate: allergies, current medications, past family history, past medical history, past social history, past surgical history and problem list     Review of Systems   Constitutional: Negative  Negative for chills, fatigue and fever  HENT: Positive for hearing loss      Eyes: Negative  Negative for visual disturbance  Respiratory: Negative for shortness of breath and wheezing  Cardiovascular: Negative for chest pain and palpitations  Gastrointestinal: Negative for abdominal pain, blood in stool, constipation, diarrhea, nausea and vomiting  Endocrine: Negative  Genitourinary: Negative for difficulty urinating and dysuria  Musculoskeletal: Negative for arthralgias and myalgias  Skin: Negative  Allergic/Immunologic: Negative  Neurological: Negative for seizures and syncope  Hematological: Negative for adenopathy  Psychiatric/Behavioral: Negative  Objective:    /60 (BP Location: Left arm, Patient Position: Sitting)   Pulse 103   Temp 98 9 °F (37 2 °C) (Tympanic)   Ht 5' 5" (1 651 m)   Wt 66 7 kg (147 lb)   SpO2 95%   BMI 24 46 kg/m²      Physical Exam  Vitals and nursing note reviewed  Constitutional:       General: He is not in acute distress  Appearance: Normal appearance  He is well-developed  He is not ill-appearing, toxic-appearing or diaphoretic  HENT:      Head: Normocephalic and atraumatic  Right Ear: Tympanic membrane, ear canal and external ear normal  There is no impacted cerumen  Left Ear: Tympanic membrane, ear canal and external ear normal  There is no impacted cerumen  Nose: Nose normal  No congestion or rhinorrhea  Mouth/Throat:      Mouth: Mucous membranes are moist       Pharynx: Oropharynx is clear  No oropharyngeal exudate or posterior oropharyngeal erythema  Eyes:      General: No scleral icterus  Right eye: No discharge  Left eye: No discharge  Extraocular Movements: Extraocular movements intact  Conjunctiva/sclera: Conjunctivae normal       Pupils: Pupils are equal, round, and reactive to light  Cardiovascular:      Rate and Rhythm: Normal rate and regular rhythm  Pulses: Normal pulses  Heart sounds: Normal heart sounds  No murmur heard      No friction rub  No gallop  Pulmonary:      Effort: Pulmonary effort is normal  No respiratory distress  Breath sounds: Normal breath sounds  No wheezing, rhonchi or rales  Abdominal:      General: Bowel sounds are normal  There is no distension  Palpations: Abdomen is soft  There is no mass  Tenderness: There is no abdominal tenderness  There is no guarding or rebound  Musculoskeletal:         General: No swelling or deformity  Normal range of motion  Cervical back: Normal range of motion and neck supple  No rigidity  Right lower leg: No edema  Left lower leg: No edema  Lymphadenopathy:      Cervical: No cervical adenopathy  Skin:     General: Skin is warm and dry  Findings: No rash  Neurological:      General: No focal deficit present  Mental Status: He is alert and oriented to person, place, and time  Sensory: No sensory deficit  Motor: No weakness  Coordination: Coordination normal       Gait: Gait normal       Deep Tendon Reflexes: Reflexes are normal and symmetric  Reflexes normal    Psychiatric:         Mood and Affect: Mood normal          Behavior: Behavior normal          Thought Content:  Thought content normal          Judgment: Judgment normal

## 2022-07-28 NOTE — PATIENT INSTRUCTIONS
Medicare Preventive Visit Patient Instructions  Thank you for completing your Welcome to Medicare Visit or Medicare Annual Wellness Visit today  Your next wellness visit will be due in one year (7/29/2023)  The screening/preventive services that you may require over the next 5-10 years are detailed below  Some tests may not apply to you based off risk factors and/or age  Screening tests ordered at today's visit but not completed yet may show as past due  Also, please note that scanned in results may not display below  Preventive Screenings:  Service Recommendations Previous Testing/Comments   Colorectal Cancer Screening  · Colonoscopy    · Fecal Occult Blood Test (FOBT)/Fecal Immunochemical Test (FIT)  · Fecal DNA/Cologuard Test  · Flexible Sigmoidoscopy Age: 54-65 years old   Colonoscopy: every 10 years (May be performed more frequently if at higher risk)  OR  FOBT/FIT: every 1 year  OR  Cologuard: every 3 years  OR  Sigmoidoscopy: every 5 years  Screening may be recommended earlier than age 48 if at higher risk for colorectal cancer  Also, an individualized decision between you and your healthcare provider will decide whether screening between the ages of 74-80 would be appropriate   Colonoscopy: Not on file  FOBT/FIT: Not on file  Cologuard: Not on file  Sigmoidoscopy: Not on file    Screening Not Indicated     Prostate Cancer Screening Individualized decision between patient and health care provider in men between ages of 53-78   Medicare will cover every 12 months beginning on the day after your 50th birthday PSA: 0 2 ng/mL     History Prostate Cancer  Screening Not Indicated     Hepatitis C Screening Once for adults born between 1945 and 1965  More frequently in patients at high risk for Hepatitis C Hep C Antibody: Not on file    Screening Not Indicated   Diabetes Screening 1-2 times per year if you're at risk for diabetes or have pre-diabetes Fasting glucose: 121 mg/dL   A1C: 6 3 %    Screening Not Indicated  History Diabetes   Cholesterol Screening Once every 5 years if you don't have a lipid disorder  May order more often based on risk factors  Lipid panel: 07/20/2022    Screening Not Indicated  History Lipid Disorder      Other Preventive Screenings Covered by Medicare:  1  Abdominal Aortic Aneurysm (AAA) Screening: covered once if your at risk  You're considered to be at risk if you have a family history of AAA or a male between the age of 73-68 who smoking at least 100 cigarettes in your lifetime  2  Lung Cancer Screening: covers low dose CT scan once per year if you meet all of the following conditions: (1) Age 50-69; (2) No signs or symptoms of lung cancer; (3) Current smoker or have quit smoking within the last 15 years; (4) You have a tobacco smoking history of at least 30 pack years (packs per day x number of years you smoked); (5) You get a written order from a healthcare provider  3  Glaucoma Screening: covered annually if you're considered high risk: (1) You have diabetes OR (2) Family history of glaucoma OR (3)  aged 48 and older OR (3)  American aged 72 and older  3  Osteoporosis Screening: covered every 2 years if you meet one of the following conditions: (1) Have a vertebral abnormality; (2) On glucocorticoid therapy for more than 3 months; (3) Have primary hyperparathyroidism; (4) On osteoporosis medications and need to assess response to drug therapy  5  HIV Screening: covered annually if you're between the age of 12-76  Also covered annually if you are younger than 13 and older than 72 with risk factors for HIV infection  For pregnant patients, it is covered up to 3 times per pregnancy      Immunizations:  Immunization Recommendations   Influenza Vaccine Annual influenza vaccination during flu season is recommended for all persons aged >= 6 months who do not have contraindications   Pneumococcal Vaccine (Prevnar and Pneumovax)  * Prevnar = PCV13  * Pneumovax = PPSV23 Adults 25-60 years old: 1-3 doses may be recommended based on certain risk factors  Adults 72 years old: Prevnar (PCV13) vaccine recommended followed by Pneumovax (PPSV23) vaccine  If already received PPSV23 since turning 65, then PCV13 recommended at least one year after PPSV23 dose  Hepatitis B Vaccine 3 dose series if at intermediate or high risk (ex: diabetes, end stage renal disease, liver disease)   Tetanus (Td) Vaccine - COST NOT COVERED BY MEDICARE PART B Following completion of primary series, a booster dose should be given every 10 years to maintain immunity against tetanus  Td may also be given as tetanus wound prophylaxis  Tdap Vaccine - COST NOT COVERED BY MEDICARE PART B Recommended at least once for all adults  For pregnant patients, recommended with each pregnancy  Shingles Vaccine (Shingrix) - COST NOT COVERED BY MEDICARE PART B  2 shot series recommended in those aged 48 and above     Health Maintenance Due:  There are no preventive care reminders to display for this patient  Immunizations Due:      Topic Date Due    COVID-19 Vaccine (1) Never done    Pneumococcal Vaccine: 65+ Years (1 - PCV) Never done    Influenza Vaccine (1) 09/01/2022     Advance Directives   What are advance directives? Advance directives are legal documents that state your wishes and plans for medical care  These plans are made ahead of time in case you lose your ability to make decisions for yourself  Advance directives can apply to any medical decision, such as the treatments you want, and if you want to donate organs  What are the types of advance directives? There are many types of advance directives, and each state has rules about how to use them  You may choose a combination of any of the following:  · Living will: This is a written record of the treatment you want  You can also choose which treatments you do not want, which to limit, and which to stop at a certain time   This includes surgery, medicine, IV fluid, and tube feedings  · Durable power of  for healthcare [facility]): This is a written record that states who you want to make healthcare choices for you when you are unable to make them for yourself  This person, called a proxy, is usually a family member or a friend  You may choose more than 1 proxy  · Do not resuscitate (DNR) order:  A DNR order is used in case your heart stops beating or you stop breathing  It is a request not to have certain forms of treatment, such as CPR  A DNR order may be included in other types of advance directives  · Medical directive: This covers the care that you want if you are in a coma, near death, or unable to make decisions for yourself  You can list the treatments you want for each condition  Treatment may include pain medicine, surgery, blood transfusions, dialysis, IV or tube feedings, and a ventilator (breathing machine)  · Values history: This document has questions about your views, beliefs, and how you feel and think about life  This information can help others choose the care that you would choose  Why are advance directives important? An advance directive helps you control your care  Although spoken wishes may be used, it is better to have your wishes written down  Spoken wishes can be misunderstood, or not followed  Treatments may be given even if you do not want them  An advance directive may make it easier for your family to make difficult choices about your care  © Copyright [employer] 2018 Information is for End User's use only and may not be sold, redistributed or otherwise used for commercial purposes   All illustrations and images included in CareNotes® are the copyrighted property of A D A M , Inc  or [address] [place] [address] theRightAPIBanner [facility]well Medical Center

## 2022-08-08 ENCOUNTER — CONSULT (OUTPATIENT)
Dept: NEPHROLOGY | Facility: CLINIC | Age: 87
End: 2022-08-08
Payer: MEDICARE

## 2022-08-08 VITALS
DIASTOLIC BLOOD PRESSURE: 72 MMHG | HEART RATE: 84 BPM | HEIGHT: 65 IN | WEIGHT: 147 LBS | SYSTOLIC BLOOD PRESSURE: 148 MMHG | OXYGEN SATURATION: 97 % | BODY MASS INDEX: 24.49 KG/M2

## 2022-08-08 DIAGNOSIS — N18.32 CHRONIC KIDNEY DISEASE (CKD) STAGE G3B/A1, MODERATELY DECREASED GLOMERULAR FILTRATION RATE (GFR) BETWEEN 30-44 ML/MIN/1.73 SQUARE METER AND ALBUMINURIA CREATININE RATIO LESS THAN 30 MG/G (HCC): ICD-10-CM

## 2022-08-08 DIAGNOSIS — N17.9 ACUTE RENAL FAILURE, UNSPECIFIED ACUTE RENAL FAILURE TYPE (HCC): Primary | ICD-10-CM

## 2022-08-08 DIAGNOSIS — E11.9 TYPE 2 DIABETES MELLITUS WITHOUT COMPLICATION, WITHOUT LONG-TERM CURRENT USE OF INSULIN (HCC): ICD-10-CM

## 2022-08-08 DIAGNOSIS — E78.00 HYPERCHOLESTEROLEMIA: ICD-10-CM

## 2022-08-08 DIAGNOSIS — I10 ESSENTIAL HYPERTENSION: ICD-10-CM

## 2022-08-08 LAB
SL AMB  POCT GLUCOSE, UA: NEGATIVE
SL AMB LEUKOCYTE ESTERASE,UA: NEGATIVE
SL AMB POCT BILIRUBIN,UA: ABNORMAL
SL AMB POCT BLOOD,UA: NEGATIVE
SL AMB POCT CLARITY,UA: CLEAR
SL AMB POCT COLOR,UA: ABNORMAL
SL AMB POCT KETONES,UA: ABNORMAL
SL AMB POCT NITRITE,UA: NEGATIVE
SL AMB POCT PH,UA: 5
SL AMB POCT SPECIFIC GRAVITY,UA: 1.03
SL AMB POCT URINE PROTEIN: 100
SL AMB POCT UROBILINOGEN: NORMAL

## 2022-08-08 PROCEDURE — 99204 OFFICE O/P NEW MOD 45 MIN: CPT | Performed by: INTERNAL MEDICINE

## 2022-08-08 PROCEDURE — 81002 URINALYSIS NONAUTO W/O SCOPE: CPT | Performed by: INTERNAL MEDICINE

## 2022-08-08 RX ORDER — LISINOPRIL 5 MG/1
5 TABLET ORAL DAILY
Qty: 90 TABLET | Refills: 2 | Status: SHIPPED | OUTPATIENT
Start: 2022-08-08

## 2022-08-08 RX ORDER — DOXAZOSIN 2 MG/1
2 TABLET ORAL
Qty: 90 TABLET | Refills: 3 | Status: SHIPPED | OUTPATIENT
Start: 2022-08-08

## 2022-08-08 NOTE — ASSESSMENT & PLAN NOTE
Lab Results   Component Value Date    HGBA1C 6 3 (H) 07/20/2022    Under your supervision with  good control

## 2022-08-08 NOTE — PATIENT INSTRUCTIONS
Have labs in one month  Have a kim ultrasound  Drink water and do not exercise for 2 days piror to your labs

## 2022-08-08 NOTE — ASSESSMENT & PLAN NOTE
He had a rise in creatinine to 1 54 mg/dL from a previously lower level of 1 35 mg/dL  This represents a 10% drop in his GFR  I explained the meaning of GFR and creatinine to him and he understood   Inciting factors include lisinopril 20 mg daily which can alter renal auto regulation, as well as having his labs done right after he comes from the gym  He does go to the gym 3 times a week and may have an increased creatinine low due to increased muscle breakdown  Will obtain a kidney ultrasound to rule out age-related nephron loss  Will stop high-dose lisinopril and switch it to 5 mg daily  Will add doxazosin 2 mg at bedtime which may help his urinary pattern as he does have frequent nocturia    This would help improve his sleep pattern

## 2022-08-08 NOTE — PROGRESS NOTES
[facility] [place], [place]    Name: Rachel Page  YOB: 1935      Assessment/Plan:           Problem List Items Addressed This Visit        Endocrine    Type 2 diabetes mellitus without complication, without long-term current use of insulin ([facility] [address] )       Lab Results   Component Value Date    HGBA1C 6 3 (H) 07/20/2022    Under your supervision with  good control            Cardiovascular and Mediastinum    Essential hypertension      Blood pressure is well controlled for his age  Do not think he needs such high-dose lisinopril  Relevant Medications    lisinopril (ZESTRIL) 5 mg tablet    doxazosin (CARDURA) 2 mg tablet    Other Relevant Orders    Urinalysis with microscopic    Microalbumin / creatinine urine ratio    Protein / creatinine ratio, urine    US kidney and bladder    Basic metabolic panel       Genitourinary    Chronic kidney disease (CKD) stage G3b/A1, moderately decreased glomerular filtration rate (GFR) between 30-44 mL/min/1 73 square meter and albuminuria creatinine ratio less than 30 mg/g ([facility])    Relevant Medications    lisinopril (ZESTRIL) 5 mg tablet    doxazosin (CARDURA) 2 mg tablet    Other Relevant Orders    POCT urine dip (Completed)    Urinalysis with microscopic    Microalbumin / creatinine urine ratio    Protein / creatinine ratio, urine    US kidney and bladder    Basic metabolic panel    Acute renal failure ([facility] [address] ) - Primary     He had a rise in creatinine to 1 54 mg/dL from a previously lower level of 1 35 mg/dL  This represents a 10% drop in his GFR  I explained the meaning of GFR and creatinine to him and he understood   Inciting factors include lisinopril 20 mg daily which can alter renal auto regulation, as well as having his labs done right after he comes from the gym  He does go to the gym 3 times a week and may have an increased creatinine low due to increased muscle breakdown      Will obtain a kidney ultrasound to rule out age-related nephron loss  Will stop high-dose lisinopril and switch it to 5 mg daily  Will add doxazosin 2 mg at bedtime which may help his urinary pattern as he does have frequent nocturia  This would help improve his sleep pattern            Other    Hypercholesterolemia     Aim for an LDL < 70                 Subjective:      Patient ID: Hussain Ordaz is a 82 y o  male  referred by Dr Mamie Osuna    HPI Has a history of hypertension and diabetes mellitus -2  He has had DM -2 since 2000  He has  No retinopathy or heart disease  He rarely takes Advil  Lab work demonstrated a creatinine of 1 49 mg/dl  He has had CKD dating back to 2018 with a creatinine of 1 35 mg/dl    The following portions of the patient's history were reviewed and updated as appropriate: allergies, current medications, past family history, past medical history, past social history, past surgical history and problem list     Review of Systems   Constitutional: Negative for fatigue  HENT: Negative for hearing loss  Eyes: Negative for visual disturbance  Respiratory: Positive for shortness of breath  Negative for cough  Occasional dyspnea on mowing grass with a push mower   Cardiovascular: Negative for chest pain, palpitations and leg swelling  Gastrointestinal: Negative for abdominal pain, blood in stool, constipation, diarrhea, nausea and vomiting  Genitourinary: Negative for decreased urine volume, difficulty urinating, dysuria and hematuria  Nocturia 3-5 times   Musculoskeletal: Negative for arthralgias, back pain and gait problem  Skin: Negative  Neurological: Negative for dizziness, weakness and light-headedness  Hematological: Does not bruise/bleed easily  Psychiatric/Behavioral: Negative for dysphoric mood           Social History     Socioeconomic History    Marital status: /Civil Union     Spouse name: None    Number of children: None    Years of education: None    Highest education level: None   Occupational History    None   Tobacco Use    Smoking status: Never Smoker    Smokeless tobacco: Never Used   Substance and Sexual Activity    Alcohol use: Yes    Drug use: No    Sexual activity: None   Other Topics Concern    None   Social History Narrative    Influenza 10/7/11 was refused       Social Determinants of Health     Financial Resource Strain: Not on file   Food Insecurity: Not on file   Transportation Needs: Not on file   Physical Activity: Not on file   Stress: Not on file   Social Connections: Not on file   Intimate Partner Violence: Not on file   Housing Stability: Not on file     Past Medical History:   Diagnosis Date    Aortic aneurysm ([address] )     Back pain     Carotid stenosis     Prostate cancer [facility])      Past Surgical History:   Procedure Laterality Date    OTHER SURGICAL HISTORY  2005    prostate cancer S/P    THORACIC AORTIC ANEURYSM REPAIR         Current Outpatient Medications:     amLODIPine (NORVASC) 10 mg tablet, Take 1 tablet (10 mg total) by mouth daily, Disp: 90 tablet, Rfl: 3    aspirin (ECOTRIN LOW STRENGTH) 81 mg EC tablet, Take 81 mg by mouth daily, Disp: , Rfl:     CINNAMON PO, Take by mouth daily, Disp: , Rfl:     doxazosin (CARDURA) 2 mg tablet, Take 1 tablet (2 mg total) by mouth daily at bedtime, Disp: 90 tablet, Rfl: 3    lisinopril (ZESTRIL) 5 mg tablet, Take 1 tablet (5 mg total) by mouth daily, Disp: 90 tablet, Rfl: 2    metFORMIN (GLUCOPHAGE) 500 mg tablet, Take 1 tablet (500 mg total) by mouth 2 (two) times a day with meals, Disp: 180 tablet, Rfl: 2    simvastatin (ZOCOR) 20 mg tablet, Take 1 tablet (20 mg total) by mouth daily at bedtime, Disp: 90 tablet, Rfl: 3    Lab Results   Component Value Date     05/10/2018    SODIUM 139 07/20/2022    K 4 5 07/20/2022     07/20/2022    CO2 19 (L) 07/20/2022    ANIONGAP 17 8 05/10/2018    AGAP 12 07/20/2022    BUN 26 (H) 07/20/2022    CREATININE 1 49 (H) 07/20/2022    GLUF 121 (H) 07/20/2022    CALCIUM 9 0 07/20/2022    AST 15 07/20/2022    ALT 19 07/20/2022    ALKPHOS 54 07/20/2022    TP 7 6 07/20/2022    TBILI 0 87 07/20/2022    EGFR 42 07/20/2022     Lab Results   Component Value Date    WBC 9 29 07/20/2022    HGB 12 4 07/20/2022    HCT 41 0 07/20/2022    MCV 75 (L) 07/20/2022     07/20/2022     Lab Results   Component Value Date    CHOLESTEROL 96 07/20/2022    CHOLESTEROL 99 07/12/2021     Lab Results   Component Value Date    HDL 51 07/20/2022    HDL 49 07/12/2021    HDL 50 05/10/2018     Lab Results   Component Value Date    LDLCALC 28 07/20/2022    LDLCALC 29 07/12/2021    LDLCALC 48 8 (L) 05/10/2018     Lab Results   Component Value Date    TRIG 84 07/20/2022    TRIG 107 07/12/2021    TRIG 114 05/10/2018     No results found for: CHOLHDL  Lab Results   Component Value Date    WQT6EBCXYDVO 3 010 07/20/2022     Lab Results   Component Value Date    CALCIUM 9 0 07/20/2022     No results found for: SPEP, UPEP  No results found for: CONSUELO ALLEN4HUR        Objective:      /72 (BP Location: Left arm, Patient Position: Sitting, Cuff Size: Standard)   Pulse 84   Ht 5' 5" (1 651 m)   Wt 66 7 kg (147 lb)   SpO2 97%   BMI 24 46 kg/m²          Physical Exam  Vitals reviewed  Constitutional:       General: He is not in acute distress  Appearance: He is normal weight  He is not toxic-appearing  HENT:      Head: Normocephalic and atraumatic  Right Ear: External ear normal       Left Ear: External ear normal    Eyes:      Extraocular Movements: Extraocular movements intact  Conjunctiva/sclera: Conjunctivae normal       Pupils: Pupils are equal, round, and reactive to light  Neck:      Vascular: No carotid bruit  Cardiovascular:      Rate and Rhythm: Normal rate and regular rhythm  Pulmonary:      Effort: Pulmonary effort is normal       Breath sounds: Normal breath sounds  Abdominal:      General: Bowel sounds are normal  There is no distension  Palpations: Abdomen is soft  Tenderness: There is no abdominal tenderness  Musculoskeletal:      Cervical back: Normal range of motion  No rigidity  Lymphadenopathy:      Cervical: No cervical adenopathy  Neurological:      Mental Status: He is alert

## 2022-08-15 ENCOUNTER — HOSPITAL ENCOUNTER (OUTPATIENT)
Dept: ULTRASOUND IMAGING | Facility: HOSPITAL | Age: 87
Discharge: HOME | End: 2022-08-15
Attending: INTERNAL MEDICINE
Payer: MEDICARE

## 2022-08-15 DIAGNOSIS — N18.32 CHRONIC KIDNEY DISEASE (CKD) STAGE G3B/A1, MODERATELY DECREASED GLOMERULAR FILTRATION RATE (GFR) BETWEEN 30-44 ML/MIN/1.73 SQUARE METER AND ALBUMINURIA CREATININE RATIO LESS THAN 30 MG/G (HCC): ICD-10-CM

## 2022-08-15 DIAGNOSIS — I10 ESSENTIAL HYPERTENSION: ICD-10-CM

## 2022-08-15 PROCEDURE — 76770 US EXAM ABDO BACK WALL COMP: CPT

## 2022-09-29 DIAGNOSIS — Z79.4 TYPE 2 DIABETES MELLITUS WITH COMPLICATION, WITH LONG-TERM CURRENT USE OF INSULIN (HCC): ICD-10-CM

## 2022-09-29 DIAGNOSIS — E11.8 TYPE 2 DIABETES MELLITUS WITH COMPLICATION, WITH LONG-TERM CURRENT USE OF INSULIN (HCC): ICD-10-CM

## 2022-10-11 PROBLEM — Z00.00 MEDICARE ANNUAL WELLNESS VISIT, SUBSEQUENT: Status: RESOLVED | Noted: 2021-07-22 | Resolved: 2022-10-11

## 2022-10-17 LAB
LEFT EYE DIABETIC RETINOPATHY: NORMAL
RIGHT EYE DIABETIC RETINOPATHY: NORMAL

## 2022-10-31 DIAGNOSIS — E78.5 HYPERLIPIDEMIA, UNSPECIFIED HYPERLIPIDEMIA TYPE: ICD-10-CM

## 2022-10-31 DIAGNOSIS — I10 HYPERTENSION, UNSPECIFIED TYPE: ICD-10-CM

## 2022-10-31 RX ORDER — AMLODIPINE BESYLATE 10 MG/1
TABLET ORAL
Qty: 90 TABLET | Refills: 0 | Status: SHIPPED | OUTPATIENT
Start: 2022-10-31 | End: 2022-11-01

## 2022-10-31 RX ORDER — SIMVASTATIN 20 MG
TABLET ORAL
Qty: 90 TABLET | Refills: 0 | Status: SHIPPED | OUTPATIENT
Start: 2022-10-31 | End: 2022-11-01

## 2022-11-01 DIAGNOSIS — I10 HYPERTENSION, UNSPECIFIED TYPE: ICD-10-CM

## 2022-11-01 DIAGNOSIS — E78.5 HYPERLIPIDEMIA, UNSPECIFIED HYPERLIPIDEMIA TYPE: ICD-10-CM

## 2022-11-01 RX ORDER — SIMVASTATIN 20 MG
TABLET ORAL
Qty: 90 TABLET | Refills: 0 | Status: SHIPPED | OUTPATIENT
Start: 2022-11-01

## 2022-11-01 RX ORDER — AMLODIPINE BESYLATE 10 MG/1
TABLET ORAL
Qty: 90 TABLET | Refills: 0 | Status: SHIPPED | OUTPATIENT
Start: 2022-11-01

## 2022-11-25 ENCOUNTER — TELEPHONE (OUTPATIENT)
Dept: NEPHROLOGY | Facility: CLINIC | Age: 87
End: 2022-11-25

## 2022-11-25 NOTE — TELEPHONE ENCOUNTER
Called patient unable to leave voicemail regarding labs to be done before upcoming appointment  Will try later

## 2022-11-30 ENCOUNTER — APPOINTMENT (OUTPATIENT)
Dept: LAB | Facility: CLINIC | Age: 87
End: 2022-11-30

## 2022-11-30 DIAGNOSIS — I10 ESSENTIAL HYPERTENSION: ICD-10-CM

## 2022-11-30 DIAGNOSIS — N18.32 CHRONIC KIDNEY DISEASE (CKD) STAGE G3B/A1, MODERATELY DECREASED GLOMERULAR FILTRATION RATE (GFR) BETWEEN 30-44 ML/MIN/1.73 SQUARE METER AND ALBUMINURIA CREATININE RATIO LESS THAN 30 MG/G (HCC): ICD-10-CM

## 2022-12-01 LAB
ANION GAP SERPL CALCULATED.3IONS-SCNC: 13 MMOL/L (ref 4–13)
BUN SERPL-MCNC: 22 MG/DL (ref 5–25)
CALCIUM SERPL-MCNC: 9.3 MG/DL (ref 8.3–10.1)
CHLORIDE SERPL-SCNC: 110 MMOL/L (ref 96–108)
CO2 SERPL-SCNC: 17 MMOL/L (ref 21–32)
CREAT SERPL-MCNC: 1.31 MG/DL (ref 0.6–1.3)
CREAT UR-MCNC: 686 MG/DL
CREAT UR-MCNC: 686 MG/DL
GFR SERPL CREATININE-BSD FRML MDRD: 49 ML/MIN/1.73SQ M
GLUCOSE P FAST SERPL-MCNC: 113 MG/DL (ref 65–99)
MICROALBUMIN UR-MCNC: 224 MG/L (ref 0–20)
MICROALBUMIN/CREAT 24H UR: 33 MG/G CREATININE (ref 0–30)
POTASSIUM SERPL-SCNC: 4.3 MMOL/L (ref 3.5–5.3)
PROT UR-MCNC: 57 MG/DL
PROT/CREAT UR: 0.08 MG/G{CREAT} (ref 0–0.1)
SODIUM SERPL-SCNC: 140 MMOL/L (ref 135–147)

## 2022-12-07 NOTE — PROGRESS NOTES
Assessment & Plan:    1  Acute renal failure, unspecified acute renal failure type ([address] )    2  Chronic kidney disease (CKD) stage G3b/A1, moderately decreased glomerular filtration rate (GFR) between 30-44 mL/min/1 73 square meter and albuminuria creatinine ratio less than 30 mg/g [facility])  Assessment & Plan:  Lab Results   Component Value Date    EGFR 49 11/30/2022    EGFR 42 07/20/2022    EGFR 41 07/12/2021    CREATININE 1 31 (H) 11/30/2022    CREATININE 1 49 (H) 07/20/2022    CREATININE 1 54 (H) 07/12/2021   Function improved with reduction of lisinopril  Dr Amy Rolon is now remarks lisinopril reduced from 20 mg down to 5 mg, the patient reports he has been cutting his tablet in half so he is likely getting 10 mg  Will follow as is at this time  Consider reduction or discontinuation of lisinopril, if indicated  I did advise patient to hold lisinopril and metformin if he experiences poor p o  intake and GI losses for the prevention of acute kidney injury  Otherwise, blood pressures well controlled patient is active and goes to the gym 3 days/week  Routine follow-up with Dr Amy Rolon in 4 months with labs prior  We will also repeat renal ultrasound prior to that visit to monitor simple renal cyst     Orders:  -     CBC and differential; Future; Expected date: 04/03/2023  -     Comprehensive metabolic panel; Future; Expected date: 04/03/2023  -     Magnesium; Future; Expected date: 04/03/2023  -     Microalbumin / creatinine urine ratio; Future; Expected date: 04/03/2023  -     Phosphorus; Future; Expected date: 04/03/2023  -     PTH, intact; Future; Expected date: 04/03/2023  -     Protein / creatinine ratio, urine; Future; Expected date: 04/03/2023  -     Urinalysis with microscopic; Future; Expected date: 04/03/2023    3   Type 2 diabetes mellitus without complication, without long-term current use of insulin [facility])  Assessment & Plan:    Lab Results   Component Value Date    HGBA1C 6 3 (H) 07/20/2022   Reduce simple carbohydrates      4  Essential hypertension  Assessment & Plan:  Controlled  Continue antihypertensives and low-sodium diet  5  Overweight (BMI 25 0-29  9)  Assessment & Plan:  Recommend whole foods, portion control diet  Continue exercise regimen as tolerated  6  Renal cyst  Assessment & Plan:  Repeat renal U/S in 6 mths  Orders:  -     US kidney and bladder; Future; Expected date: 03/27/2023       The benefits, risks and alternatives to the treatment plan were discussed at this visit  Patient was advised of common adverse effects of any medical therapies prescribed  All questions were answered and discussed with the patient and any accompanying family members or caretakers  Subjective:      Patient ID: Wilber Victoria is a 82 y o  male seen in the [place] office  Patient was seen in consultation by Dr Claudia Elliott on 8/8/2022 valuation of acute kidney injury versus CKD with creatinine rise to 1 5 mg/dL from prior 1 3 mg/dL  At that time, lisinopril was reduced from 20 mg down to 5 mg in favor of addition of doxazosin 2 mg at bedtime  HPI     Today, patient presents for follow-up of consultation  No complaints  He goes to the gym Monday Wednesday and Fridays  Patient denies any changes in health, medication changes, hospitalizations, surgeries or trip to the emergency room, falls or bone fractures since last visit  Blood pressure is 138/62 with a heart rate of 87  Patient does monitor blood pressures at home and reports  Denies headaches, lightheadedness, dizziness  Patient reports adherence with antihypertensive regimen and denies adverse effects: Amlodipine 10 mg daily, doxazosin 2 mg at bedtime and lisinopril 10 mg daily  He has been cutting his 20 mg in half  Patient denies lower extremity swelling      Reviewed and discussed the results of labs performed 11/30/2022 which revealed a renal function improved to creatinine of 1 31 mg/dL with estimated GFR 49 mL/min  History is obtained from patient  The following portions of the patient's history were reviewed and updated as appropriate: allergies, current medications, past family history, past medical history, past social history, past surgical history, and problem list     Review of Systems   Constitutional: Negative for activity change, chills, diaphoresis, fatigue and fever  HENT: Negative for mouth sores and trouble swallowing  Respiratory: Negative for apnea, cough, chest tightness, shortness of breath and wheezing  Cardiovascular: Negative for chest pain, palpitations and leg swelling  Gastrointestinal: Negative for abdominal distention, abdominal pain, blood in stool, constipation, diarrhea and nausea  Genitourinary: Negative for decreased urine volume, difficulty urinating, dysuria, enuresis, frequency, hematuria and urgency  Musculoskeletal: Negative for arthralgias, back pain and joint swelling  Skin: Negative for pallor, rash and wound  Neurological: Negative for dizziness, seizures, light-headedness, numbness and headaches  Hematological: Does not bruise/bleed easily  Psychiatric/Behavioral: Negative for agitation, behavioral problems and confusion  The patient is not nervous/anxious  Objective:      /62   Pulse 87   Ht 5' 5" (1 651 m)   Wt 67 8 kg (149 lb 6 4 oz)   SpO2 97%   BMI 24 86 kg/m²          Physical Exam  Vitals and nursing note reviewed  Constitutional:       General: He is awake  He is not in acute distress  Appearance: Normal appearance  He is well-developed and normal weight  He is not ill-appearing, toxic-appearing or diaphoretic  HENT:      Head: Normocephalic and atraumatic  Jaw: There is normal jaw occlusion  Nose: Nose normal       Mouth/Throat:      Mouth: Mucous membranes are moist       Pharynx: Oropharynx is clear  No oropharyngeal exudate or posterior oropharyngeal erythema     Eyes:      General: Lids are normal  Vision grossly intact  Gaze aligned appropriately  No scleral icterus  Right eye: No discharge  Left eye: No discharge  Extraocular Movements: Extraocular movements intact  Conjunctiva/sclera: Conjunctivae normal       Pupils: Pupils are equal, round, and reactive to light  Neck:      Thyroid: No thyroid mass or thyromegaly  Trachea: Trachea and phonation normal    Cardiovascular:      Rate and Rhythm: Normal rate and regular rhythm  Heart sounds: Normal heart sounds, S1 normal and S2 normal  No murmur heard  No friction rub  No gallop  Pulmonary:      Effort: Pulmonary effort is normal  No respiratory distress  Breath sounds: Normal breath sounds  No stridor  No wheezing, rhonchi or rales  Abdominal:      General: Abdomen is flat  Bowel sounds are normal  There is no distension  Palpations: Abdomen is soft  There is no mass  Tenderness: There is no abdominal tenderness  There is no guarding  Hernia: No hernia is present  Musculoskeletal:         General: Normal range of motion  Cervical back: Normal range of motion and neck supple  No rigidity or tenderness  Right lower leg: No edema  Left lower leg: No edema  Lymphadenopathy:      Cervical: No cervical adenopathy  Skin:     General: Skin is warm and dry  Coloration: Skin is not jaundiced  Findings: No bruising  Nails: There is no clubbing  Neurological:      General: No focal deficit present  Mental Status: He is alert and oriented to person, place, and time  Mental status is at baseline  Psychiatric:         Attention and Perception: Attention and perception normal          Mood and Affect: Mood and affect normal          Speech: Speech normal          Behavior: Behavior normal  Behavior is cooperative  Thought Content:  Thought content normal          Judgment: Judgment normal              Lab Results   Component Value Date     05/10/2018 SODIUM 140 11/30/2022    K 4 3 11/30/2022     (H) 11/30/2022    CO2 17 (L) 11/30/2022    ANIONGAP 17 8 05/10/2018    AGAP 13 11/30/2022    BUN 22 11/30/2022    CREATININE 1 31 (H) 11/30/2022    GLUF 113 (H) 11/30/2022    CALCIUM 9 3 11/30/2022    AST 15 07/20/2022    ALT 19 07/20/2022    ALKPHOS 54 07/20/2022    TP 7 6 07/20/2022    TBILI 0 87 07/20/2022    EGFR 49 11/30/2022      Lab Results   Component Value Date    CREATININE 1 31 (H) 11/30/2022    CREATININE 1 49 (H) 07/20/2022    CREATININE 1 54 (H) 07/12/2021    CREATININE 1 35 (H) 05/10/2018      Lab Results   Component Value Date    COLORU bonnie 08/08/2022    CLARITYU clear 08/08/2022    LEUKOCYTESUR Negative 08/08/2022    NITRITE Negative 08/08/2022    PROTEIN  08/08/2022    GLUCOSEU Negative 08/08/2022    KETONESU Small 08/08/2022    UROBILINOGEN Normal 08/08/2022    BILIRUBINUR Moderate 08/08/2022    BLOODU Negative 08/08/2022      No results found for: LABPROT  No results found for: Alfa Carmen  Lab Results   Component Value Date    WBC 9 29 07/20/2022    HGB 12 4 07/20/2022    HCT 41 0 07/20/2022    MCV 75 (L) 07/20/2022     07/20/2022      Lab Results   Component Value Date    HGB 12 4 07/20/2022    HGB 12 1 07/12/2021      No results found for: IRON, TIBC, FERRITIN   No results found for: PTHCALCIUM, [id number], PHOSPHORUS   Lab Results   Component Value Date    CHOLESTEROL 96 07/20/2022    HDL 51 07/20/2022    LDLCALC 28 07/20/2022    TRIG 84 07/20/2022      No results found for: URICACID   Lab Results   Component Value Date    HGBA1C 6 3 (H) 07/20/2022      No results found for: TSHANTIBODY, N2KWQSD, FREET4   No results found for: BATSHEVA, DSDNAAB, RFIGM   No results found for: PROT, UPEP, IMMUNOFIX, KAPPALAMBDA, KAPPALIGHT     Portions of the record may have been created with voice recognition software   Occasional wrong word or "sound a like" substitutions may have occurred due to the inherent limitations of voice recognition software  Read the chart carefully and recognize, using context, where substitutions have occurred  If you have any questions, please contact the dictating provider

## 2022-12-08 ENCOUNTER — OFFICE VISIT (OUTPATIENT)
Dept: NEPHROLOGY | Facility: CLINIC | Age: 87
End: 2022-12-08

## 2022-12-08 VITALS
HEART RATE: 87 BPM | HEIGHT: 65 IN | DIASTOLIC BLOOD PRESSURE: 62 MMHG | OXYGEN SATURATION: 97 % | WEIGHT: 149.4 LBS | BODY MASS INDEX: 24.89 KG/M2 | SYSTOLIC BLOOD PRESSURE: 138 MMHG

## 2022-12-08 DIAGNOSIS — N28.1 RENAL CYST: ICD-10-CM

## 2022-12-08 DIAGNOSIS — E11.9 TYPE 2 DIABETES MELLITUS WITHOUT COMPLICATION, WITHOUT LONG-TERM CURRENT USE OF INSULIN (HCC): ICD-10-CM

## 2022-12-08 DIAGNOSIS — E66.3 OVERWEIGHT (BMI 25.0-29.9): ICD-10-CM

## 2022-12-08 DIAGNOSIS — I10 ESSENTIAL HYPERTENSION: ICD-10-CM

## 2022-12-08 DIAGNOSIS — N18.32 CHRONIC KIDNEY DISEASE (CKD) STAGE G3B/A1, MODERATELY DECREASED GLOMERULAR FILTRATION RATE (GFR) BETWEEN 30-44 ML/MIN/1.73 SQUARE METER AND ALBUMINURIA CREATININE RATIO LESS THAN 30 MG/G (HCC): ICD-10-CM

## 2022-12-08 DIAGNOSIS — N17.9 ACUTE RENAL FAILURE, UNSPECIFIED ACUTE RENAL FAILURE TYPE (HCC): Primary | ICD-10-CM

## 2022-12-08 NOTE — PATIENT INSTRUCTIONS
For the prevention of kidney injury: If you are sick and not eating well or drinking well OR vomiting or having diarrhea, temporarily hold your ACE-inhibitor / ARB / diuretic (lisinopril and metformin) while sick and call office for further instructions  Otherwise, please take medications as prescribed  Please avoid NSAIDs (nonsteroidal anti-inflammatory drugs), such as Advil (ibuprofen), Aleve (naproxen), Naprosyn, BCs, Goody's powder  Tylenol (acetaminophen) is a safer option for the kidneys  Do not exceed the maximum dose of acetaminophen  Note that this may be in combination with other drugs NSAID medications  Please avoid herbal supplements, such as turmeric  Please consult your nephrologist before taking any over-the-counter medications

## 2022-12-08 NOTE — ASSESSMENT & PLAN NOTE
Lab Results   Component Value Date    EGFR 49 11/30/2022    EGFR 42 07/20/2022    EGFR 41 07/12/2021    CREATININE 1 31 (H) 11/30/2022    CREATININE 1 49 (H) 07/20/2022    CREATININE 1 54 (H) 07/12/2021   Function improved with reduction of lisinopril  Dr Shakira Hernandez is now remarks lisinopril reduced from 20 mg down to 5 mg, the patient reports he has been cutting his tablet in half so he is likely getting 10 mg  Will follow as is at this time  Consider reduction or discontinuation of lisinopril, if indicated  I did advise patient to hold lisinopril and metformin if he experiences poor p o  intake and GI losses for the prevention of acute kidney injury  Otherwise, blood pressures well controlled patient is active and goes to the gym 3 days/week  Routine follow-up with Dr Shakira Hernandez in 4 months with labs prior    We will also repeat renal ultrasound prior to that visit to monitor simple renal cyst

## 2023-01-26 ENCOUNTER — RA CDI HCC (OUTPATIENT)
Dept: OTHER | Facility: HOSPITAL | Age: 88
End: 2023-01-26

## 2023-01-26 NOTE — PROGRESS NOTES
[facility]  coding opportunities          Chart Reviewed number of suggestions sent to Provider: 1     Patients Insurance     Medicare Insurance: Medicare        [id number]

## 2023-01-27 ENCOUNTER — APPOINTMENT (OUTPATIENT)
Dept: LAB | Facility: CLINIC | Age: 88
End: 2023-01-27

## 2023-01-27 DIAGNOSIS — E11.9 TYPE 2 DIABETES MELLITUS WITHOUT COMPLICATION, WITHOUT LONG-TERM CURRENT USE OF INSULIN (HCC): ICD-10-CM

## 2023-01-27 LAB — GLUCOSE P FAST SERPL-MCNC: 128 MG/DL (ref 65–99)

## 2023-01-28 LAB
EST. AVERAGE GLUCOSE BLD GHB EST-MCNC: 128 MG/DL
HBA1C MFR BLD: 6.1 %

## 2023-02-02 ENCOUNTER — OFFICE VISIT (OUTPATIENT)
Dept: FAMILY MEDICINE CLINIC | Facility: CLINIC | Age: 88
End: 2023-02-02

## 2023-02-02 VITALS
TEMPERATURE: 97.2 F | HEART RATE: 96 BPM | OXYGEN SATURATION: 98 % | HEIGHT: 65 IN | SYSTOLIC BLOOD PRESSURE: 138 MMHG | WEIGHT: 148.4 LBS | BODY MASS INDEX: 24.72 KG/M2 | DIASTOLIC BLOOD PRESSURE: 64 MMHG

## 2023-02-02 DIAGNOSIS — R06.02 SOB (SHORTNESS OF BREATH): ICD-10-CM

## 2023-02-02 DIAGNOSIS — I10 ESSENTIAL HYPERTENSION: ICD-10-CM

## 2023-02-02 DIAGNOSIS — E11.22 TYPE 2 DIABETES MELLITUS WITH STAGE 3B CHRONIC KIDNEY DISEASE, WITHOUT LONG-TERM CURRENT USE OF INSULIN (HCC): ICD-10-CM

## 2023-02-02 DIAGNOSIS — E78.00 HYPERCHOLESTEROLEMIA: ICD-10-CM

## 2023-02-02 DIAGNOSIS — N18.32 CHRONIC KIDNEY DISEASE (CKD) STAGE G3B/A1, MODERATELY DECREASED GLOMERULAR FILTRATION RATE (GFR) BETWEEN 30-44 ML/MIN/1.73 SQUARE METER AND ALBUMINURIA CREATININE RATIO LESS THAN 30 MG/G (HCC): ICD-10-CM

## 2023-02-02 DIAGNOSIS — Z23 ENCOUNTER FOR IMMUNIZATION: ICD-10-CM

## 2023-02-02 DIAGNOSIS — E11.9 TYPE 2 DIABETES MELLITUS WITHOUT COMPLICATION, WITHOUT LONG-TERM CURRENT USE OF INSULIN (HCC): Primary | ICD-10-CM

## 2023-02-02 DIAGNOSIS — N18.32 TYPE 2 DIABETES MELLITUS WITH STAGE 3B CHRONIC KIDNEY DISEASE, WITHOUT LONG-TERM CURRENT USE OF INSULIN (HCC): ICD-10-CM

## 2023-02-02 NOTE — PATIENT INSTRUCTIONS
Type 2 Diabetes Management for Adults   AMBULATORY CARE:   Type 2 diabetes  is a disease that affects how your body uses glucose (sugar)  Either your body cannot make enough insulin, or it cannot use the insulin correctly  It is important to keep diabetes controlled to prevent damage to your heart, blood vessels, and other organs  Have someone call your local emergency number (911 in the [address] [facility]) if:   · You cannot be woken  · You have signs of diabetic ketoacidosis:     ? confusion, fatigue    ? vomiting    ? rapid heartbeat    ? fruity smelling breath    ? extreme thirst    ? dry mouth and skin    · You have any of the following signs of a heart attack:      ? Squeezing, pressure, or pain in your chest    ? You may  also have any of the following:     § Discomfort or pain in your back, neck, jaw, stomach, or arm    § Shortness of breath    § Nausea or vomiting    § Lightheadedness or a sudden cold sweat    · You have any of the following signs of a stroke:      ? Numbness or drooping on one side of your face     ? Weakness in an arm or leg    ? Confusion or difficulty speaking    ? Dizziness, a severe headache, or vision loss    Call your doctor or diabetes care team if:   · You have a sore or wound that will not heal     · You have a change in the amount you urinate  · Your blood sugar levels are higher than your target goals  · You often have lower blood sugar levels than your target goals  · Your skin is red, dry, warm, or swollen  · You have trouble coping with diabetes, or you feel anxious or depressed  · You have questions or concerns about your condition or care  What you need to know about high blood sugar levels:  High blood sugar levels may not cause any symptoms  You may feel more thirsty or urinate more often than usual  Over time, high blood sugar levels can damage your nerves, blood vessels, tissues, and organs   The following can increase your blood sugar levels:  · Large meals or large amounts of carbohydrates at one time    · Less physical activity    · Stress    · Illness    · A lower dose of medicine or insulin, or a late dose    What you need to know about low blood sugar levels: You can prevent symptoms such as shakiness, dizziness, irritability, or confusion by preventing your blood sugar levels from going too low  · Treat a low blood sugar level right away  ? Drink 4 ounces of juice or have 1 tube of glucose gel  ? Check your blood sugar level again 10 to 15 minutes later  ? When the level goes back to normal, eat a meal or snack to prevent another decrease  · Keep glucose gel, raisins, or hard candy with you at all times to treat a low blood sugar level  · Your blood sugar level can get too low if you take diabetes medicine or insulin and do not eat enough food  · If you use insulin, check your blood sugar level before you exercise  ? If your blood sugar level is below 100 mg/dL, eat 4 crackers or 2 ounces of raisins, or drink 4 ounces of juice  ? Check your level every 30 minutes if you exercise more than 1 hour  ? You may need a snack during or after exercise  What you can do to manage your blood sugar levels:   · Check your blood sugar levels as directed and as needed  Several items are available to use to check your levels  You may need to check by testing a drop of blood in a glucose monitor  You may instead be given a continuous glucose monitoring (CGM) device  The device is worn at all times  The CGM checks your blood sugar level every 5 minutes  It sends results to an electronic device such as a smart phone  A CGM can be used with or without an insulin pump  Talk with your provider to find out which method is best for you  The goal for blood sugar levels before meals  is between 80 and 130 mg/dL and 2 hours after eating  is lower than 180 mg/dL  · Make healthy food choices    Work with a dietitian to develop a meal plan that works for you and your schedule  A dietitian can help you learn how to eat the right amount of carbohydrates during your meals and snacks  Carbohydrates can raise your blood sugar level if you eat too many at one time  Examples of foods that contain carbohydrates are breads, cereals, rice, pasta, and sweets  · Get regular physical activity  Physical activity can help you get to your target blood sugar level goal and manage your weight  Get at least 150 minutes of moderate to vigorous aerobic physical activity each week  Do not miss more than 2 days in a row  Do not sit longer than 30 minutes at a time  Your healthcare provider can help you create an activity plan  The plan can include the best activities for you and can help you build your strength and endurance  · Maintain a healthy weight  Ask your healthcare provider what a healthy weight is for you  Ask him or her to help you create a safe weight loss plan if you are overweight  · Take your diabetes medicine or insulin as directed  You may need diabetes medicine, insulin, or both to help control your blood sugar levels  Your healthcare provider will teach you how and when to take your diabetes medicine or insulin  You will also be taught about side effects oral diabetes medicine can cause  Insulin may be injected, or given through a pump or pen  You and your care team will discuss which method is best for you  ? An insulin pump  is an implanted device that gives your insulin 24 hours a day  An insulin pump prevents the need for multiple insulin injections in a day  ? An insulin pen  is a device prefilled with the right amount of insulin  ? You and your family members will be taught how to draw up and give insulin  if this is the best method for you  Your education team will also teach you how to dispose of needles and syringes  ? You will learn how much insulin you need  and when to give it   You will be taught when not to give insulin  You will also be taught what to do if your blood sugar level drops too low  This may happen if you take insulin and do not eat the right amount of carbohydrates  Other things you can do to manage type 2 diabetes:   · Wear medical alert identification  Wear medical alert jewelry or carry a card that says you have diabetes  Ask your provider where to get these items  · Do not smoke  Nicotine and other chemicals in cigarettes and cigars can cause lung and blood vessel damage  It also makes it more difficult to manage your diabetes  Ask your provider for information if you currently smoke and need help to quit  Do not use e-cigarettes or smokeless tobacco in place of cigarettes or to help you quit  They still contain nicotine  · Check your feet each day for cuts, scratches, calluses, or other wounds  Look for redness and swelling, and feel for warmth  Wear shoes that fit well  Check your shoes for rocks or other objects that can hurt your feet  Do not walk barefoot or wear shoes without socks  Wear cotton socks to help keep your feet dry  · Ask about vaccines you may need  You have a higher risk for serious illness if you get the flu, pneumonia, COVID-19, or hepatitis  Ask your provider if you should get vaccines to prevent these or other diseases, and when to get the vaccines  · Talk to your care team if you become stressed about diabetes care  Sometimes being able to fit diabetes care into your life can cause increased stress  The stress can cause you not to take care of yourself properly  Your care team can help by offering tips about self-care  Your care team may suggest you talk to a mental health provider  The provider can listen and offer help with self-care issues  Follow up with your doctor or diabetes care team as directed: You may need to have blood tests done before your follow-up visit   The test results will show if changes need to be made in your treatment or self-care  Write down your questions so you remember to ask them during your visits  Talk to your provider if you cannot afford your medicine  © Copyright [employer] 2022 Information is for End User's use only and may not be sold, redistributed or otherwise used for commercial purposes  All illustrations and images included in CareNotes® are the copyrighted property of [employer]  or [employer] So   The above information is an  only  It is not intended as medical advice for individual conditions or treatments  Talk to your doctor, nurse or pharmacist before following any medical regimen to see if it is safe and effective for you  Basic Carbohydrate Counting   AMBULATORY CARE:   Carbohydrate counting  is a way to plan your meals by counting the amount of carbohydrate in foods  Carbohydrates are the sugars, starches, and fiber found in fruit, grains, vegetables, and milk products  Carbohydrates increase your blood sugar levels  Carbohydrate counting can help you eat the right amount of carbohydrate to keep your blood sugar levels under control  What you need to know about planning meals using carbohydrate counting:  · A dietitian or healthcare provider will help you develop a healthy meal plan that works best for you  You will be taught how much carbohydrate to eat or drink for each meal and snack  Your meal plan will be based on your age, weight, usual food intake, and physical activity level  If you have diabetes, it will also include your blood sugar levels and diabetes medicine  Once you know how much carbohydrate you should eat, you can decide what type of food you want to eat  · You will need to know what foods contain carbohydrate and how much they contain  Keep track of the amount of carbohydrate in meals and snacks in order to follow your meal plan  Do not avoid carbohydrates or skip meals   Your blood sugar may fall too low if you do not eat enough carbohydrate or you skip meals  Foods that contain carbohydrate:   · Breads:  Each serving of food listed below contains about 15 g of carbohydrate   ? 1 slice of bread (1 ounce) or 1 flour or corn tortilla (6 inch)    ? ½ of a hamburger bun or ¼ of a large bagel (about 1 ounce)    ? 1 pancake (about 4 inches across and ¼ inch thick)    · Cereals and grains:  Serving sizes of ready-to-eat cereals vary  Look at the serving size and the total carbohydrate amount listed on the food label  Each serving of food listed below contains about 15 g of carbohydrate   ? ¾ cup of dry, unsweetened, ready-to-eat cereal or ¼ cup of low-fat granola     ? ½ cup of oatmeal or other cooked cereal     ? ? cup of cooked rice or pasta    · Starchy vegetables and beans:  Each serving of food listed below contains about 15 g of carbohydrate   ? ½ cup of corn, green peas, sweet potatoes, or mashed potatoes    ? ¼ of a large baked potato    ? ½ cup of beans, lentils, and peas (garbanzo, moreland, kidney, white, split, black-eyed)    · Crackers and snacks:  Each serving of food listed below contains about 15 g of carbohydrate   ? 3 ct cracker squares or 8 animal crackers     ? 6 saltine-type crackers    ? 3 cups of popcorn or ¾ ounce of pretzels, potato chips, or tortilla chips    · Fruit:  Each serving of food listed below contains about 15 g of carbohydrate   ? 1 small (4 ounce) piece of fresh fruit or ¾ to 1 cup of fresh fruit    ? ½ cup of canned or frozen fruit, packed in natural juice    ? ½ cup (4 ounces) of unsweetened fruit juice    ? 2 tablespoons of dried fruit    · Desserts or sugary foods:  Each serving of food listed below contains about 15 g of carbohydrate   ? 2-inch square unfrosted cake or brownie     ? 2 small cookies    ? ½ cup of ice cream, frozen yogurt, or nondairy frozen yogurt    ? ¼ cup of sherbet or sorbet    ? 1 tablespoon of regular syrup, jam, or jelly    ?  2 tablespoons of light syrup    · Milk and yogurt:  Foods from the milk group contain about 12 g of carbohydrate per serving  ? 1 cup of fat-free or low-fat milk    ? 1 cup of soy milk    ? ? cup of fat-free, yogurt sweetened with artificial sweetener    · Non-starchy vegetables:  Each serving contains about 5 g of carbohydrate   Three servings of non-starch vegetables count as 1 carbohydrate serving  ? ½ cup of cooked vegetables or 1 cup of raw vegetables  This includes beets, broccoli, cabbage, cauliflower, cucumber, mushrooms, tomatoes, and zucchini    ? ½ cup of vegetable juice    How to use carbohydrate counting to plan meals:   · Count carbohydrate amounts using serving sizes:      ? Pasta dinner example: You plan to have pasta, tossed salad, and an 8-ounce glass of milk  Your healthcare provider tells you that you may have 4 carbohydrate servings for dinner  One carbohydrate serving of pasta is ? cup  One cup of pasta will equal 3 carbohydrate servings  An 8-ounce glass of milk will count as 1 carbohydrate serving  These amounts of food would equal 4 carbohydrate servings  One cup of tossed salad does not count toward your carbohydrate servings  · Count carbohydrate amounts using food labels:  Find the total amount of carbohydrate in a packaged food by reading the food label  Food labels tell you the serving size of the food and the total carbohydrate amount in each serving  Find the serving size on the food label and then decide how many servings you will eat  Multiply the number of servings you plan to eat by the carbohydrate amount per serving  ? Granola bar snack example: Your meal plan allows you to have 2 carbohydrate servings (30 grams) of carbohydrate for a snack  You plan to eat 1 package of granola bars, which contains 2 bars  According to the food label, the serving size of food in this package is 1 bar  Each serving (1 bar) contains 25 grams of carbohydrate   The total amount of carbohydrate in this package of granola bars would be 50 g  Based on your meal plan, you should eat only 1 bar  Follow up with your doctor as directed:  Write down your questions so you remember to ask them during your visits  © Copyright [employer] 2022 Information is for End User's use only and may not be sold, redistributed or otherwise used for commercial purposes  All illustrations and images included in CareNotes® are the copyrighted property of [employer]  or [facility] Radha So   The above information is an  only  It is not intended as medical advice for individual conditions or treatments  Talk to your doctor, nurse or pharmacist before following any medical regimen to see if it is safe and effective for you

## 2023-02-02 NOTE — PROGRESS NOTES
Assessment/Plan:    No problem-specific Assessment & Plan notes found for this encounter  Diagnoses and all orders for this visit:    Type 2 diabetes mellitus without complication, without long-term current use of insulin ([facility] [address] )  -     Diabetic foot exam; Future    Encounter for immunization  -     Pneumococcal Conjugate Vaccine 20-valent (PCV20)  -     influenza vaccine, high-dose, PF 0 7 mL (FLUZONE HIGH-DOSE)    SOB (shortness of breath)  -     Complete PFT; Future    Chronic kidney disease (CKD) stage G3b/A1, moderately decreased glomerular filtration rate (GFR) between 30-44 mL/min/1 73 square meter and albuminuria creatinine ratio less than 30 mg/g (HCC)    Type 2 diabetes mellitus with stage 3b chronic kidney disease, without long-term current use of insulin ([facility])    Essential hypertension  -     CBC and differential; Future  -     TSH, 3rd generation with Free T4 reflex; Future    Hypercholesterolemia  -     Comprehensive metabolic panel; Future  -     Lipid panel; Future          PHQ-2/9 Depression Screening    Little interest or pleasure in doing things: 0 - not at all  Feeling down, depressed, or hopeless: 0 - not at all  PHQ-2 Score: 0  PHQ-2 Interpretation: Negative depression screen        Patient's shoes and socks removed  Right Foot/Ankle   Right Foot Inspection  Skin Exam: skin normal and skin intact  No dry skin, no warmth, no callus, no erythema, no maceration, no abnormal color, no pre-ulcer, no ulcer and no callus  Toe Exam: ROM and strength within normal limits  no right toe deformity    Sensory   Monofilament testing: intact    Vascular  Capillary refills: < 3 seconds  The right DP pulse is 2+  Left Foot/Ankle  Left Foot Inspection  Skin Exam: skin normal and skin intact  No dry skin, no warmth, no erythema, no maceration, normal color, no pre-ulcer, no ulcer and no callus  Toe Exam: ROM and strength within normal limits  No left toe deformity       Sensory   Monofilament testing: intact    Vascular  Capillary refills: < 3 seconds  The left DP pulse is 2+  Assign Risk Category  No deformity present  No loss of protective sensation  No weak pulses  Risk: 0      Subjective:      Patient ID: Trace Nunez is a 82 y o  male  Diabetes  He presents for his follow-up diabetic visit  He has type 2 diabetes mellitus  His disease course has been stable  Pertinent negatives for diabetes include no chest pain  The following portions of the patient's history were reviewed and updated as appropriate: allergies, current medications, past family history, past medical history, past social history, past surgical history and problem list     Review of Systems   Eyes: Negative for visual disturbance  Cardiovascular: Negative for chest pain and palpitations  Gastrointestinal: Negative for abdominal pain, nausea and vomiting  Genitourinary: Positive for frequency  Skin: Negative for wound  Neurological: Negative for numbness  Objective:    /64 (BP Location: Left arm, Patient Position: Sitting)   Pulse 96   Temp (!) 97 2 °F (36 2 °C) (Tympanic)   Ht 5' 5" (1 651 m)   Wt 67 3 kg (148 lb 6 4 oz)   SpO2 98%   BMI 24 70 kg/m²      Physical Exam  Vitals and nursing note reviewed  Constitutional:       General: He is not in acute distress  Appearance: Normal appearance  He is not ill-appearing, toxic-appearing or diaphoretic  HENT:      Head: Normocephalic and atraumatic  Mouth/Throat:      Mouth: Mucous membranes are moist    Eyes:      Conjunctiva/sclera: Conjunctivae normal    Cardiovascular:      Rate and Rhythm: Normal rate and regular rhythm  Pulses: no weak pulses          Dorsalis pedis pulses are 2+ on the right side and 2+ on the left side  Heart sounds: Normal heart sounds  No murmur heard  Pulmonary:      Effort: Pulmonary effort is normal  No respiratory distress  Breath sounds: Normal breath sounds   No wheezing, rhonchi or rales    Abdominal:      General: There is no distension  Palpations: Abdomen is soft  There is no mass  Tenderness: There is no abdominal tenderness  There is no guarding or rebound  Musculoskeletal:      Cervical back: Normal range of motion  No rigidity  Right lower leg: No edema  Left lower leg: No edema  Feet:      Right foot:      Skin integrity: No ulcer, skin breakdown, erythema, warmth, callus or dry skin  Left foot:      Skin integrity: No ulcer, skin breakdown, erythema, warmth, callus or dry skin  Lymphadenopathy:      Cervical: No cervical adenopathy  Neurological:      Mental Status: He is alert and oriented to person, place, and time  Psychiatric:         Mood and Affect: Mood normal          Behavior: Behavior normal          Thought Content:  Thought content normal          Judgment: Judgment normal

## 2023-02-06 DIAGNOSIS — E78.5 HYPERLIPIDEMIA, UNSPECIFIED HYPERLIPIDEMIA TYPE: ICD-10-CM

## 2023-02-06 DIAGNOSIS — I10 HYPERTENSION, UNSPECIFIED TYPE: ICD-10-CM

## 2023-02-06 RX ORDER — AMLODIPINE BESYLATE 10 MG/1
10 TABLET ORAL DAILY
Qty: 90 TABLET | Refills: 1 | Status: SHIPPED | OUTPATIENT
Start: 2023-02-06

## 2023-02-06 RX ORDER — SIMVASTATIN 20 MG
20 TABLET ORAL
Qty: 90 TABLET | Refills: 1 | Status: SHIPPED | OUTPATIENT
Start: 2023-02-06

## 2023-03-27 ENCOUNTER — HOSPITAL ENCOUNTER (OUTPATIENT)
Dept: ULTRASOUND IMAGING | Facility: HOSPITAL | Age: 88
Discharge: HOME | End: 2023-03-27

## 2023-03-27 DIAGNOSIS — N28.1 RENAL CYST: ICD-10-CM

## 2023-04-03 ENCOUNTER — APPOINTMENT (OUTPATIENT)
Dept: LAB | Facility: CLINIC | Age: 88
End: 2023-04-03

## 2023-04-03 DIAGNOSIS — N18.32 CHRONIC KIDNEY DISEASE (CKD) STAGE G3B/A1, MODERATELY DECREASED GLOMERULAR FILTRATION RATE (GFR) BETWEEN 30-44 ML/MIN/1.73 SQUARE METER AND ALBUMINURIA CREATININE RATIO LESS THAN 30 MG/G (HCC): ICD-10-CM

## 2023-04-03 LAB
ALBUMIN SERPL BCP-MCNC: 3.8 G/DL (ref 3.5–5)
ALP SERPL-CCNC: 50 U/L (ref 46–116)
ALT SERPL W P-5'-P-CCNC: 17 U/L (ref 12–78)
ANION GAP SERPL CALCULATED.3IONS-SCNC: 6 MMOL/L (ref 4–13)
AST SERPL W P-5'-P-CCNC: 20 U/L (ref 5–45)
BACTERIA UR QL AUTO: ABNORMAL /HPF
BASOPHILS # BLD AUTO: 0.05 THOUSANDS/ÂΜL (ref 0–0.1)
BASOPHILS NFR BLD AUTO: 1 % (ref 0–1)
BILIRUB SERPL-MCNC: 0.61 MG/DL (ref 0.2–1)
BILIRUB UR QL STRIP: NEGATIVE
BUN SERPL-MCNC: 23 MG/DL (ref 5–25)
CALCIUM SERPL-MCNC: 9.5 MG/DL (ref 8.3–10.1)
CHLORIDE SERPL-SCNC: 109 MMOL/L (ref 96–108)
CLARITY UR: CLEAR
CO2 SERPL-SCNC: 22 MMOL/L (ref 21–32)
COLOR UR: COLORLESS
CREAT SERPL-MCNC: 1.27 MG/DL (ref 0.6–1.3)
EOSINOPHIL # BLD AUTO: 0.14 THOUSAND/ÂΜL (ref 0–0.61)
EOSINOPHIL NFR BLD AUTO: 2 % (ref 0–6)
ERYTHROCYTE [DISTWIDTH] IN BLOOD BY AUTOMATED COUNT: 16 % (ref 11.6–15.1)
GFR SERPL CREATININE-BSD FRML MDRD: 50 ML/MIN/1.73SQ M
GLUCOSE P FAST SERPL-MCNC: 122 MG/DL (ref 65–99)
GLUCOSE UR STRIP-MCNC: NEGATIVE MG/DL
HCT VFR BLD AUTO: 37.4 % (ref 36.5–49.3)
HGB BLD-MCNC: 11.6 G/DL (ref 12–17)
HGB UR QL STRIP.AUTO: NEGATIVE
IMM GRANULOCYTES # BLD AUTO: 0.03 THOUSAND/UL (ref 0–0.2)
IMM GRANULOCYTES NFR BLD AUTO: 0 % (ref 0–2)
KETONES UR STRIP-MCNC: NEGATIVE MG/DL
LEUKOCYTE ESTERASE UR QL STRIP: NEGATIVE
LYMPHOCYTES # BLD AUTO: 1.92 THOUSANDS/ÂΜL (ref 0.6–4.47)
LYMPHOCYTES NFR BLD AUTO: 24 % (ref 14–44)
MAGNESIUM SERPL-MCNC: 1.8 MG/DL (ref 1.6–2.6)
MCH RBC QN AUTO: 22.4 PG (ref 26.8–34.3)
MCHC RBC AUTO-ENTMCNC: 31 G/DL (ref 31.4–37.4)
MCV RBC AUTO: 72 FL (ref 82–98)
MONOCYTES # BLD AUTO: 0.65 THOUSAND/ÂΜL (ref 0.17–1.22)
MONOCYTES NFR BLD AUTO: 8 % (ref 4–12)
NEUTROPHILS # BLD AUTO: 5.31 THOUSANDS/ÂΜL (ref 1.85–7.62)
NEUTS SEG NFR BLD AUTO: 65 % (ref 43–75)
NITRITE UR QL STRIP: NEGATIVE
NON-SQ EPI CELLS URNS QL MICRO: ABNORMAL /HPF
NRBC BLD AUTO-RTO: 0 /100 WBCS
PH UR STRIP.AUTO: 5.5 [PH]
PHOSPHATE SERPL-MCNC: 3.1 MG/DL (ref 2.3–4.1)
PLATELET # BLD AUTO: 325 THOUSANDS/UL (ref 149–390)
PMV BLD AUTO: 9.8 FL (ref 8.9–12.7)
POTASSIUM SERPL-SCNC: 4.5 MMOL/L (ref 3.5–5.3)
PROT SERPL-MCNC: 6.9 G/DL (ref 6.4–8.4)
PROT UR STRIP-MCNC: NEGATIVE MG/DL
PTH-INTACT SERPL-MCNC: 88.1 PG/ML (ref 18.4–80.1)
RBC # BLD AUTO: 5.17 MILLION/UL (ref 3.88–5.62)
RBC #/AREA URNS AUTO: ABNORMAL /HPF
SODIUM SERPL-SCNC: 137 MMOL/L (ref 135–147)
SP GR UR STRIP.AUTO: 1.01 (ref 1–1.03)
UROBILINOGEN UR STRIP-ACNC: <2 MG/DL
WBC # BLD AUTO: 8.1 THOUSAND/UL (ref 4.31–10.16)
WBC #/AREA URNS AUTO: ABNORMAL /HPF

## 2023-04-04 LAB
CREAT UR-MCNC: 61.7 MG/DL
CREAT UR-MCNC: 61.7 MG/DL
MICROALBUMIN UR-MCNC: 26.2 MG/L (ref 0–20)
MICROALBUMIN/CREAT 24H UR: 42 MG/G CREATININE (ref 0–30)
PROT UR-MCNC: 8 MG/DL
PROT/CREAT UR: 0.13 MG/G{CREAT} (ref 0–0.1)

## 2023-04-25 ENCOUNTER — OFFICE VISIT (OUTPATIENT)
Dept: NEPHROLOGY | Facility: CLINIC | Age: 88
End: 2023-04-25

## 2023-04-25 VITALS
DIASTOLIC BLOOD PRESSURE: 66 MMHG | SYSTOLIC BLOOD PRESSURE: 158 MMHG | OXYGEN SATURATION: 97 % | WEIGHT: 149.2 LBS | BODY MASS INDEX: 24.86 KG/M2 | HEART RATE: 72 BPM | HEIGHT: 65 IN

## 2023-04-25 DIAGNOSIS — E55.9 VITAMIN D DEFICIENCY: ICD-10-CM

## 2023-04-25 DIAGNOSIS — N28.1 KIDNEY CYST, ACQUIRED: ICD-10-CM

## 2023-04-25 DIAGNOSIS — N18.31 STAGE 3A CHRONIC KIDNEY DISEASE (HCC): Primary | ICD-10-CM

## 2023-04-25 DIAGNOSIS — E11.22 TYPE 2 DIABETES MELLITUS WITH STAGE 3B CHRONIC KIDNEY DISEASE, WITHOUT LONG-TERM CURRENT USE OF INSULIN (HCC): ICD-10-CM

## 2023-04-25 DIAGNOSIS — N25.81 SECONDARY HYPERPARATHYROIDISM (HCC): ICD-10-CM

## 2023-04-25 DIAGNOSIS — N18.32 TYPE 2 DIABETES MELLITUS WITH STAGE 3B CHRONIC KIDNEY DISEASE, WITHOUT LONG-TERM CURRENT USE OF INSULIN (HCC): ICD-10-CM

## 2023-04-25 NOTE — PROGRESS NOTES
Nephrology   Office Follow-Up  Renay Goldmann 82 y o  male MRN: [id number]    Encounter: [id number]        [address] was seen in the [place] office today  All diagnoses and orders for visit:     1  Stage 3a chronic kidney disease ([facility] )  · Record review indicates baseline creatinine is around 1 3 mg/dL  Etiology is likely age-related nephron loss and diabetic kidney disease  Patient has increased muscle mass as he does actively exercise multiple times per week  His kidney function is stable and within typical baseline  Continue lisinopril for renal protection   -     Comprehensive metabolic panel; Future; Expected date: 10/02/2023   -     CBC and differential; Future; Expected date: 10/02/2023   -     Microalbumin / creatinine urine ratio; Future; Expected date: 10/02/2023   -     Urinalysis with microscopic; Future; Expected date: 10/02/2023  2  Kidney cyst, acquired   -     US kidney and bladder; Future; Expected date: 04/25/2024  3  Vitamin D deficiency   -     Vitamin D 25 hydroxy; Future; Expected date: 10/02/2023  4  Secondary hyperparathyroidism ([facility] )  · No indication for activated vitamin D agent   -     PTH, intact; Future; Expected date: 10/02/2023   -     Phosphorus; Future; Expected date: 10/02/2023  5  Type 2 diabetes mellitus with stage 3b chronic kidney disease, without long-term current use of insulin ([facility] )  · Most recent A1c excellent 6 1%        HPI: Renay Goldmann is a 82 y o  male who follows with Dr Giselle Prasad for CKD 3A in the setting of type 2 diabetes, kidney cyst   Patient states he feels well and has been exercising at the gym 3 times a week  Work will be repeated in 6 months and kidney ultrasound will be repeated in 1 year per recommendations of radiologist   He will continue to avoid potential nephrotoxins, focus on exercising  ROS:   Review of Systems   Constitutional: Negative for chills and fever          Works out 3 times a week   HENT: Negative for ear pain and sore throat  Eyes: Negative for pain and visual disturbance  Respiratory: Negative for cough and shortness of breath  Cardiovascular: Negative for chest pain and palpitations  Gastrointestinal: Negative for abdominal pain and vomiting  Genitourinary: Negative for dysuria and hematuria  Musculoskeletal: Negative for arthralgias and back pain  Skin: Negative for color change and rash  Neurological: Negative for seizures and syncope  All other systems reviewed and are negative  Allergies: Patient has no known allergies  Medications:   Current Outpatient Medications:   •  amLODIPine (NORVASC) 10 mg tablet, Take 1 tablet (10 mg total) by mouth daily, Disp: 90 tablet, Rfl: 1  •  aspirin (ECOTRIN LOW STRENGTH) 81 mg EC tablet, Take 81 mg by mouth daily, Disp: , Rfl:   •  CINNAMON PO, Take by mouth daily, Disp: , Rfl:   •  doxazosin (CARDURA) 2 mg tablet, Take 1 tablet (2 mg total) by mouth daily at bedtime, Disp: 90 tablet, Rfl: 3  •  lisinopril (ZESTRIL) 5 mg tablet, Take 1 tablet (5 mg total) by mouth daily (Patient taking differently: Take 5 mg by mouth daily Taking 10 mg), Disp: 90 tablet, Rfl: 2  •  metFORMIN (GLUCOPHAGE) 500 mg tablet, Take 1 tablet (500 mg total) by mouth 2 (two) times a day with meals, Disp: 180 tablet, Rfl: 2  •  simvastatin (ZOCOR) 20 mg tablet, Take 1 tablet (20 mg total) by mouth daily at bedtime, Disp: 90 tablet, Rfl: 1    Past Medical History:   Diagnosis Date   • Aortic aneurysm (HCC)    • Back pain    • Carotid stenosis    • Prostate cancer [facility])      Past Surgical History:   Procedure Laterality Date   • OTHER SURGICAL HISTORY  2005    prostate cancer S/P   • THORACIC AORTIC ANEURYSM REPAIR       Family History   Problem Relation Age of Onset   • Cancer Family    • Diabetes Family       reports that he has never smoked  He has never used smokeless tobacco  He reports current alcohol use  He reports that he does not use drugs        Physical Exam: "  Vitals:    04/25/23 1453   BP: 158/66   BP Location: Left arm   Patient Position: Sitting   Cuff Size: Standard   Pulse: 72   SpO2: 97%   Weight: 67 7 kg (149 lb 3 2 oz)   Height: 5' 5\" (1 651 m)     Body mass index is 24 83 kg/m²  General: conscious, cooperative, in no acute distress, appears stated age  Eyes: conjunctivae pale, anicteric sclerae  ENT: lips and mucous membranes moist  Neck: supple, no JVD, no masses  Chest:  essentially clear breath sounds bilaterally, no crackles, ronchus or wheezings  CVS: S1 & S2, normal rate, regular rhythm  Abdomen: soft, non-tender, non-distended, normoactive bowel sounds, rounded  Extremities: no edema of both legs  Skin: no rash   Neuro: awake, alert, oriented       Diagnostic Data:  Lab: I have personally reviewed pertinent lab results  ,   CBC:       CMP: No results found for: SODIUM, K, CL, CO2, ANIONGAP, BUN, CREATININE, GLUCOSE, CALCIUM, AST, ALT, ALKPHOS, PROT, BILITOT, EGFR,   PT/INR: No results found for: PT, INR,   Magnesium: No components found for: MAG,  Phosphorous: No results found for: PHOS    Patient Instructions   Get blood work in 6 months    Get ultrasound in 1 year    Come back in a year      Portions of the record may have been created with voice recognition software  Occasional wrong word or \"sound a like\" substitutions may have occurred due to the inherent limitations of voice recognition software  Read the chart carefully and recognize, using context, where substitutions have occurred  If you have any questions, please contact the dictating provider    "

## 2023-04-28 PROBLEM — N18.31 STAGE 3A CHRONIC KIDNEY DISEASE (HCC): Status: ACTIVE | Noted: 2021-08-24

## 2023-04-28 PROBLEM — N17.9 ACUTE RENAL FAILURE (HCC): Status: RESOLVED | Noted: 2022-08-08 | Resolved: 2023-04-28

## 2023-04-28 PROBLEM — E55.9 VITAMIN D DEFICIENCY: Status: ACTIVE | Noted: 2023-04-28

## 2023-04-28 PROBLEM — N25.81 SECONDARY HYPERPARATHYROIDISM (HCC): Status: ACTIVE | Noted: 2023-04-28

## 2023-05-17 ENCOUNTER — HOSPITAL ENCOUNTER (OUTPATIENT)
Dept: PULMONOLOGY | Facility: HOSPITAL | Age: 88
Discharge: HOME | End: 2023-05-17
Attending: FAMILY MEDICINE

## 2023-05-17 DIAGNOSIS — R06.02 SOB (SHORTNESS OF BREATH): ICD-10-CM

## 2023-05-17 RX ORDER — ALBUTEROL SULFATE 2.5 MG/3ML
2.5 SOLUTION RESPIRATORY (INHALATION) ONCE AS NEEDED
Status: COMPLETED | OUTPATIENT
Start: 2023-05-17 | End: 2023-05-17

## 2023-05-17 RX ADMIN — ALBUTEROL SULFATE 2.5 MG: 2.5 SOLUTION RESPIRATORY (INHALATION) at 15:39

## 2023-06-26 DIAGNOSIS — Z79.4 TYPE 2 DIABETES MELLITUS WITH COMPLICATION, WITH LONG-TERM CURRENT USE OF INSULIN (HCC): ICD-10-CM

## 2023-06-26 DIAGNOSIS — E11.8 TYPE 2 DIABETES MELLITUS WITH COMPLICATION, WITH LONG-TERM CURRENT USE OF INSULIN (HCC): ICD-10-CM

## 2023-06-30 DIAGNOSIS — Z79.4 TYPE 2 DIABETES MELLITUS WITH COMPLICATION, WITH LONG-TERM CURRENT USE OF INSULIN (HCC): ICD-10-CM

## 2023-06-30 DIAGNOSIS — E11.8 TYPE 2 DIABETES MELLITUS WITH COMPLICATION, WITH LONG-TERM CURRENT USE OF INSULIN (HCC): ICD-10-CM

## 2023-06-30 NOTE — TELEPHONE ENCOUNTER
Disregard last message      I saw this medication was sent over on 6/27/23     Called [facility] to clarify spoke to grisel she stated it is their and ready for        Called patient to notify him of this he did not answer and was unable to leave a voice mail

## 2023-06-30 NOTE — TELEPHONE ENCOUNTER
Patient came into the office and stated he will be out of this medication can we send this over       Please advise

## 2023-07-22 DIAGNOSIS — E78.5 HYPERLIPIDEMIA, UNSPECIFIED HYPERLIPIDEMIA TYPE: ICD-10-CM

## 2023-07-22 DIAGNOSIS — I10 HYPERTENSION, UNSPECIFIED TYPE: ICD-10-CM

## 2023-07-22 RX ORDER — AMLODIPINE BESYLATE 10 MG/1
10 TABLET ORAL DAILY
Qty: 90 TABLET | Refills: 0 | Status: SHIPPED | OUTPATIENT
Start: 2023-07-22 | End: 2023-09-05 | Stop reason: SDUPTHER

## 2023-07-22 RX ORDER — SIMVASTATIN 20 MG
20 TABLET ORAL
Qty: 30 TABLET | Refills: 0 | Status: SHIPPED | OUTPATIENT
Start: 2023-07-22 | End: 2023-09-05 | Stop reason: SDUPTHER

## 2023-07-27 DIAGNOSIS — N18.32 CHRONIC KIDNEY DISEASE (CKD) STAGE G3B/A1, MODERATELY DECREASED GLOMERULAR FILTRATION RATE (GFR) BETWEEN 30-44 ML/MIN/1.73 SQUARE METER AND ALBUMINURIA CREATININE RATIO LESS THAN 30 MG/G (HCC): ICD-10-CM

## 2023-07-27 DIAGNOSIS — I10 ESSENTIAL HYPERTENSION: ICD-10-CM

## 2023-07-28 ENCOUNTER — TELEPHONE (OUTPATIENT)
Dept: FAMILY MEDICINE CLINIC | Facility: CLINIC | Age: 88
End: 2023-07-28

## 2023-07-28 RX ORDER — LISINOPRIL 5 MG/1
5 TABLET ORAL DAILY
Qty: 90 TABLET | Refills: 2 | Status: SHIPPED | OUTPATIENT
Start: 2023-07-28

## 2023-07-28 NOTE — TELEPHONE ENCOUNTER
[facility] called in needs clarification on lisinopril directions state   take 5mg daily, taking 10 mg. ... Please advise. .. [facility]

## 2023-08-02 ENCOUNTER — TELEPHONE (OUTPATIENT)
Dept: FAMILY MEDICINE CLINIC | Facility: CLINIC | Age: 88
End: 2023-08-02

## 2023-08-02 NOTE — TELEPHONE ENCOUNTER
Pharmacy called asking about pts lisinopril. Its prescribed as 5mg but says take 10mg.  Please verfiy the dose and how often

## 2023-08-02 NOTE — TELEPHONE ENCOUNTER
Patient came into the office today to clarify his medication refill issues. He brought his bottle of Lisinopril stating he takes 20 mg of lisinopril daily. He states he has always taken this and doesn't know why it would have been changed. And he needs a 90 day supply of it. He also states that he gets amlodipine and simvastatin from [facility] in [facility], but the incorrect amount keeps getting sent it. He states he is supposed to get a six month supply of each of those medications, but that's not what is being sent in. He needs a 180 supply sent in for Simvastatin and Amlodipine.

## 2023-08-07 ENCOUNTER — TELEPHONE (OUTPATIENT)
Dept: NEPHROLOGY | Facility: CLINIC | Age: 88
End: 2023-08-07

## 2023-08-07 DIAGNOSIS — N18.32 CHRONIC KIDNEY DISEASE (CKD) STAGE G3B/A1, MODERATELY DECREASED GLOMERULAR FILTRATION RATE (GFR) BETWEEN 30-44 ML/MIN/1.73 SQUARE METER AND ALBUMINURIA CREATININE RATIO LESS THAN 30 MG/G (HCC): ICD-10-CM

## 2023-08-07 DIAGNOSIS — I10 ESSENTIAL HYPERTENSION: ICD-10-CM

## 2023-08-07 RX ORDER — LISINOPRIL 20 MG/1
20 TABLET ORAL DAILY
Qty: 90 TABLET | Refills: 2 | Status: SHIPPED | OUTPATIENT
Start: 2023-08-07 | End: 2023-08-07 | Stop reason: SDUPTHER

## 2023-08-07 RX ORDER — LISINOPRIL 20 MG/1
20 TABLET ORAL DAILY
Qty: 90 TABLET | Refills: 2 | Status: SHIPPED | OUTPATIENT
Start: 2023-08-07

## 2023-08-07 NOTE — TELEPHONE ENCOUNTER
Please review 12/8/22 office note. Patient need refill on Lisinopril. What dosage should he be taking? 20mg, 10mg or 5mg?          He will need #180 6 months supply called into [facility]

## 2023-08-07 NOTE — TELEPHONE ENCOUNTER
Called and spoke to patient. He is aware t take 20mg. He states he does not keep track of BP's because it's always good.

## 2023-08-15 DIAGNOSIS — N18.32 CHRONIC KIDNEY DISEASE (CKD) STAGE G3B/A1, MODERATELY DECREASED GLOMERULAR FILTRATION RATE (GFR) BETWEEN 30-44 ML/MIN/1.73 SQUARE METER AND ALBUMINURIA CREATININE RATIO LESS THAN 30 MG/G (HCC): ICD-10-CM

## 2023-08-15 DIAGNOSIS — I10 ESSENTIAL HYPERTENSION: ICD-10-CM

## 2023-08-15 RX ORDER — DOXAZOSIN 2 MG/1
2 TABLET ORAL
Qty: 90 TABLET | Refills: 3 | Status: SHIPPED | OUTPATIENT
Start: 2023-08-15

## 2023-08-23 ENCOUNTER — APPOINTMENT (OUTPATIENT)
Dept: LAB | Facility: CLINIC | Age: 88
End: 2023-08-23
Payer: MEDICARE

## 2023-08-23 DIAGNOSIS — I10 ESSENTIAL HYPERTENSION: ICD-10-CM

## 2023-08-23 DIAGNOSIS — E11.9 TYPE 2 DIABETES MELLITUS WITHOUT COMPLICATION, WITHOUT LONG-TERM CURRENT USE OF INSULIN (HCC): ICD-10-CM

## 2023-08-23 DIAGNOSIS — E78.00 HYPERCHOLESTEROLEMIA: ICD-10-CM

## 2023-08-23 LAB
ALBUMIN SERPL BCP-MCNC: 4.1 G/DL (ref 3.5–5)
ALP SERPL-CCNC: 43 U/L (ref 34–104)
ALT SERPL W P-5'-P-CCNC: 14 U/L (ref 7–52)
ANION GAP SERPL CALCULATED.3IONS-SCNC: 13 MMOL/L
AST SERPL W P-5'-P-CCNC: 20 U/L (ref 13–39)
BASOPHILS # BLD AUTO: 0.05 THOUSANDS/ÂΜL (ref 0–0.1)
BASOPHILS NFR BLD AUTO: 1 % (ref 0–1)
BILIRUB SERPL-MCNC: 0.88 MG/DL (ref 0.2–1)
BUN SERPL-MCNC: 21 MG/DL (ref 5–25)
CALCIUM SERPL-MCNC: 9.3 MG/DL (ref 8.4–10.2)
CHLORIDE SERPL-SCNC: 108 MMOL/L (ref 96–108)
CHOLEST SERPL-MCNC: 93 MG/DL
CO2 SERPL-SCNC: 19 MMOL/L (ref 21–32)
CREAT SERPL-MCNC: 1.39 MG/DL (ref 0.6–1.3)
EOSINOPHIL # BLD AUTO: 0.16 THOUSAND/ÂΜL (ref 0–0.61)
EOSINOPHIL NFR BLD AUTO: 2 % (ref 0–6)
ERYTHROCYTE [DISTWIDTH] IN BLOOD BY AUTOMATED COUNT: 15.7 % (ref 11.6–15.1)
EST. AVERAGE GLUCOSE BLD GHB EST-MCNC: 131 MG/DL
GFR SERPL CREATININE-BSD FRML MDRD: 45 ML/MIN/1.73SQ M
GLUCOSE P FAST SERPL-MCNC: 115 MG/DL (ref 65–99)
HBA1C MFR BLD: 6.2 %
HCT VFR BLD AUTO: 37.6 % (ref 36.5–49.3)
HDLC SERPL-MCNC: 51 MG/DL
HGB BLD-MCNC: 11.5 G/DL (ref 12–17)
IMM GRANULOCYTES # BLD AUTO: 0.03 THOUSAND/UL (ref 0–0.2)
IMM GRANULOCYTES NFR BLD AUTO: 0 % (ref 0–2)
LDLC SERPL CALC-MCNC: 23 MG/DL (ref 0–100)
LYMPHOCYTES # BLD AUTO: 2.08 THOUSANDS/ÂΜL (ref 0.6–4.47)
LYMPHOCYTES NFR BLD AUTO: 27 % (ref 14–44)
MCH RBC QN AUTO: 22.4 PG (ref 26.8–34.3)
MCHC RBC AUTO-ENTMCNC: 30.6 G/DL (ref 31.4–37.4)
MCV RBC AUTO: 73 FL (ref 82–98)
MONOCYTES # BLD AUTO: 0.57 THOUSAND/ÂΜL (ref 0.17–1.22)
MONOCYTES NFR BLD AUTO: 7 % (ref 4–12)
NEUTROPHILS # BLD AUTO: 4.81 THOUSANDS/ÂΜL (ref 1.85–7.62)
NEUTS SEG NFR BLD AUTO: 63 % (ref 43–75)
NONHDLC SERPL-MCNC: 42 MG/DL
NRBC BLD AUTO-RTO: 0 /100 WBCS
PLATELET # BLD AUTO: 316 THOUSANDS/UL (ref 149–390)
PMV BLD AUTO: 10.5 FL (ref 8.9–12.7)
POTASSIUM SERPL-SCNC: 4.4 MMOL/L (ref 3.5–5.3)
PROT SERPL-MCNC: 6.5 G/DL (ref 6.4–8.4)
RBC # BLD AUTO: 5.13 MILLION/UL (ref 3.88–5.62)
SODIUM SERPL-SCNC: 140 MMOL/L (ref 135–147)
TRIGL SERPL-MCNC: 96 MG/DL
TSH SERPL DL<=0.05 MIU/L-ACNC: 3.61 UIU/ML (ref 0.45–4.5)
WBC # BLD AUTO: 7.7 THOUSAND/UL (ref 4.31–10.16)

## 2023-08-23 PROCEDURE — 80061 LIPID PANEL: CPT

## 2023-08-23 PROCEDURE — 84443 ASSAY THYROID STIM HORMONE: CPT

## 2023-08-23 PROCEDURE — 36415 COLL VENOUS BLD VENIPUNCTURE: CPT

## 2023-08-23 PROCEDURE — 85025 COMPLETE CBC W/AUTO DIFF WBC: CPT

## 2023-08-23 PROCEDURE — 80053 COMPREHEN METABOLIC PANEL: CPT

## 2023-08-23 PROCEDURE — 83036 HEMOGLOBIN GLYCOSYLATED A1C: CPT

## 2023-08-29 ENCOUNTER — OFFICE VISIT (OUTPATIENT)
Dept: FAMILY MEDICINE CLINIC | Facility: CLINIC | Age: 88
End: 2023-08-29

## 2023-08-29 VITALS
WEIGHT: 146 LBS | SYSTOLIC BLOOD PRESSURE: 118 MMHG | TEMPERATURE: 97.8 F | BODY MASS INDEX: 24.32 KG/M2 | OXYGEN SATURATION: 97 % | DIASTOLIC BLOOD PRESSURE: 62 MMHG | HEIGHT: 65 IN | HEART RATE: 82 BPM

## 2023-08-29 DIAGNOSIS — Z12.5 SCREENING FOR PROSTATE CANCER: ICD-10-CM

## 2023-08-29 DIAGNOSIS — N18.32 TYPE 2 DIABETES MELLITUS WITH STAGE 3B CHRONIC KIDNEY DISEASE, WITHOUT LONG-TERM CURRENT USE OF INSULIN (HCC): ICD-10-CM

## 2023-08-29 DIAGNOSIS — N18.31 STAGE 3A CHRONIC KIDNEY DISEASE (HCC): ICD-10-CM

## 2023-08-29 DIAGNOSIS — I10 ESSENTIAL HYPERTENSION: ICD-10-CM

## 2023-08-29 DIAGNOSIS — E11.22 TYPE 2 DIABETES MELLITUS WITH STAGE 3B CHRONIC KIDNEY DISEASE, WITHOUT LONG-TERM CURRENT USE OF INSULIN (HCC): ICD-10-CM

## 2023-08-29 DIAGNOSIS — N28.1 KIDNEY CYST, ACQUIRED: ICD-10-CM

## 2023-08-29 DIAGNOSIS — Z00.00 MEDICARE ANNUAL WELLNESS VISIT, SUBSEQUENT: Primary | ICD-10-CM

## 2023-08-29 NOTE — PATIENT INSTRUCTIONS
Medicare Preventive Visit Patient Instructions  Thank you for completing your Welcome to Medicare Visit or Medicare Annual Wellness Visit today. Your next wellness visit will be due in one year (8/28/2024). The screening/preventive services that you may require over the next 5-10 years are detailed below. Some tests may not apply to you based off risk factors and/or age. Screening tests ordered at today's visit but not completed yet may show as past due. Also, please note that scanned in results may not display below. Preventive Screenings:  Service Recommendations Previous Testing/Comments   Colorectal Cancer Screening  · Colonoscopy    · Fecal Occult Blood Test (FOBT)/Fecal Immunochemical Test (FIT)  · Fecal DNA/Cologuard Test  · Flexible Sigmoidoscopy Age: 43-73 years old   Colonoscopy: every 10 years (May be performed more frequently if at higher risk)  OR  FOBT/FIT: every 1 year  OR  Cologuard: every 3 years  OR  Sigmoidoscopy: every 5 years  Screening may be recommended earlier than age 39 if at higher risk for colorectal cancer. Also, an individualized decision between you and your healthcare provider will decide whether screening between the ages of 77-80 would be appropriate. Colonoscopy: Not on file  FOBT/FIT: Not on file  Cologuard: Not on file  Sigmoidoscopy: Not on file          Prostate Cancer Screening Individualized decision between patient and health care provider in men between ages of 53-66   Medicare will cover every 12 months beginning on the day after your 50th birthday PSA: 0.2 ng/mL           Hepatitis C Screening Once for adults born between 1945 and 1965  More frequently in patients at high risk for Hepatitis C Hep C Antibody: Not on file        Diabetes Screening 1-2 times per year if you're at risk for diabetes or have pre-diabetes Fasting glucose: 115 mg/dL (8/23/2023)  A1C: 6.2 % (8/23/2023)      Cholesterol Screening Once every 5 years if you don't have a lipid disorder.  May order more often based on risk factors. Lipid panel: 08/23/2023         Other Preventive Screenings Covered by Medicare:  1. Abdominal Aortic Aneurysm (AAA) Screening: covered once if your at risk. You're considered to be at risk if you have a family history of AAA or a male between the age of 70-76 who smoking at least 100 cigarettes in your lifetime. 2. Lung Cancer Screening: covers low dose CT scan once per year if you meet all of the following conditions: (1) Age 48-67; (2) No signs or symptoms of lung cancer; (3) Current smoker or have quit smoking within the last 15 years; (4) You have a tobacco smoking history of at least 20 pack years (packs per day x number of years you smoked); (5) You get a written order from a healthcare provider. 3. Glaucoma Screening: covered annually if you're considered high risk: (1) You have diabetes OR (2) Family history of glaucoma OR (3)  aged 48 and older OR (3)  American aged 72 and older  3. Osteoporosis Screening: covered every 2 years if you meet one of the following conditions: (1) Have a vertebral abnormality; (2) On glucocorticoid therapy for more than 3 months; (3) Have primary hyperparathyroidism; (4) On osteoporosis medications and need to assess response to drug therapy. 5. HIV Screening: covered annually if you're between the age of 14-79. Also covered annually if you are younger than 13 and older than 72 with risk factors for HIV infection. For pregnant patients, it is covered up to 3 times per pregnancy.     Immunizations:  Immunization Recommendations   Influenza Vaccine Annual influenza vaccination during flu season is recommended for all persons aged >= 6 months who do not have contraindications   Pneumococcal Vaccine   * Pneumococcal conjugate vaccine = PCV13 (Prevnar 13), PCV15 (Vaxneuvance), PCV20 (Prevnar 20)  * Pneumococcal polysaccharide vaccine = PPSV23 (Pneumovax) Adults 20-63 years old: 1-3 doses may be recommended based on certain risk factors  Adults 72 years old: 1-2 doses may be recommended based off what pneumonia vaccine you previously received   Hepatitis B Vaccine 3 dose series if at intermediate or high risk (ex: diabetes, end stage renal disease, liver disease)   Tetanus (Td) Vaccine - COST NOT COVERED BY MEDICARE PART B Following completion of primary series, a booster dose should be given every 10 years to maintain immunity against tetanus. Td may also be given as tetanus wound prophylaxis. Tdap Vaccine - COST NOT COVERED BY MEDICARE PART B Recommended at least once for all adults. For pregnant patients, recommended with each pregnancy. Shingles Vaccine (Shingrix) - COST NOT COVERED BY MEDICARE PART B  2 shot series recommended in those aged 48 and above     Health Maintenance Due:  There are no preventive care reminders to display for this patient. Immunizations Due:      Topic Date Due   • COVID-19 Vaccine (1) Never done   • Pneumococcal Vaccine: 65+ Years (1 - PCV) Never done   • Influenza Vaccine (1) 09/01/2023     Advance Directives   What are advance directives? Advance directives are legal documents that state your wishes and plans for medical care. These plans are made ahead of time in case you lose your ability to make decisions for yourself. Advance directives can apply to any medical decision, such as the treatments you want, and if you want to donate organs. What are the types of advance directives? There are many types of advance directives, and each state has rules about how to use them. You may choose a combination of any of the following:  · Living will: This is a written record of the treatment you want. You can also choose which treatments you do not want, which to limit, and which to stop at a certain time. This includes surgery, medicine, IV fluid, and tube feedings. · Durable power of  for healthcare [facility]):   This is a written record that states who you want to make healthcare choices for you when you are unable to make them for yourself. This person, called a proxy, is usually a family member or a friend. You may choose more than 1 proxy. · Do not resuscitate (DNR) order:  A DNR order is used in case your heart stops beating or you stop breathing. It is a request not to have certain forms of treatment, such as CPR. A DNR order may be included in other types of advance directives. · Medical directive: This covers the care that you want if you are in a coma, near death, or unable to make decisions for yourself. You can list the treatments you want for each condition. Treatment may include pain medicine, surgery, blood transfusions, dialysis, IV or tube feedings, and a ventilator (breathing machine). · Values history: This document has questions about your views, beliefs, and how you feel and think about life. This information can help others choose the care that you would choose. Why are advance directives important? An advance directive helps you control your care. Although spoken wishes may be used, it is better to have your wishes written down. Spoken wishes can be misunderstood, or not followed. Treatments may be given even if you do not want them. An advance directive may make it easier for your family to make difficult choices about your care. © Copyright [employer] 2018 Information is for End User's use only and may not be sold, redistributed or otherwise used for commercial purposes. All illustrations and images included in CareNotes® are the copyrighted property of [employer] or [facility] Preventive Visit Patient Instructions  Thank you for completing your Welcome to Medicare Visit or Medicare Annual Wellness Visit today. Your next wellness visit will be due in one year (8/29/2024). The screening/preventive services that you may require over the next 5-10 years are detailed below. Some tests may not apply to you based off risk factors and/or age. Screening tests ordered at today's visit but not completed yet may show as past due. Also, please note that scanned in results may not display below. Preventive Screenings:  Service Recommendations Previous Testing/Comments   Colorectal Cancer Screening  · Colonoscopy    · Fecal Occult Blood Test (FOBT)/Fecal Immunochemical Test (FIT)  · Fecal DNA/Cologuard Test  · Flexible Sigmoidoscopy Age: 43-73 years old   Colonoscopy: every 10 years (May be performed more frequently if at higher risk)  OR  FOBT/FIT: every 1 year  OR  Cologuard: every 3 years  OR  Sigmoidoscopy: every 5 years  Screening may be recommended earlier than age 39 if at higher risk for colorectal cancer. Also, an individualized decision between you and your healthcare provider will decide whether screening between the ages of 77-80 would be appropriate. Colonoscopy: Not on file  FOBT/FIT: Not on file  Cologuard: Not on file  Sigmoidoscopy: Not on file    Screening Not Indicated     Prostate Cancer Screening Individualized decision between patient and health care provider in men between ages of 53-66   Medicare will cover every 12 months beginning on the day after your 50th birthday PSA: 0.2 ng/mL     History Prostate Cancer  Screening Not Indicated     Hepatitis C Screening Once for adults born between 1945 and 1965  More frequently in patients at high risk for Hepatitis C Hep C Antibody: Not on file        Diabetes Screening 1-2 times per year if you're at risk for diabetes or have pre-diabetes Fasting glucose: 115 mg/dL (8/23/2023)  A1C: 6.2 % (8/23/2023)  Screening Not Indicated  History Diabetes   Cholesterol Screening Once every 5 years if you don't have a lipid disorder. May order more often based on risk factors. Lipid panel: 08/23/2023  Screening Not Indicated  History Lipid Disorder      Other Preventive Screenings Covered by Medicare:  6. Abdominal Aortic Aneurysm (AAA) Screening: covered once if your at risk.  You're considered to be at risk if you have a family history of AAA or a male between the age of 70-76 who smoking at least 100 cigarettes in your lifetime. 7. Lung Cancer Screening: covers low dose CT scan once per year if you meet all of the following conditions: (1) Age 48-67; (2) No signs or symptoms of lung cancer; (3) Current smoker or have quit smoking within the last 15 years; (4) You have a tobacco smoking history of at least 20 pack years (packs per day x number of years you smoked); (5) You get a written order from a healthcare provider. 8. Glaucoma Screening: covered annually if you're considered high risk: (1) You have diabetes OR (2) Family history of glaucoma OR (3)  aged 48 and older OR (3)  American aged 72 and older  5. Osteoporosis Screening: covered every 2 years if you meet one of the following conditions: (1) Have a vertebral abnormality; (2) On glucocorticoid therapy for more than 3 months; (3) Have primary hyperparathyroidism; (4) On osteoporosis medications and need to assess response to drug therapy. 10. HIV Screening: covered annually if you're between the age of 14-79. Also covered annually if you are younger than 13 and older than 72 with risk factors for HIV infection. For pregnant patients, it is covered up to 3 times per pregnancy.     Immunizations:  Immunization Recommendations   Influenza Vaccine Annual influenza vaccination during flu season is recommended for all persons aged >= 6 months who do not have contraindications   Pneumococcal Vaccine   * Pneumococcal conjugate vaccine = PCV13 (Prevnar 13), PCV15 (Vaxneuvance), PCV20 (Prevnar 20)  * Pneumococcal polysaccharide vaccine = PPSV23 (Pneumovax) Adults 20-63 years old: 1-3 doses may be recommended based on certain risk factors  Adults 72 years old: 1-2 doses may be recommended based off what pneumonia vaccine you previously received   Hepatitis B Vaccine 3 dose series if at intermediate or high risk (ex: diabetes, end stage renal disease, liver disease)   Tetanus (Td) Vaccine - COST NOT COVERED BY MEDICARE PART B Following completion of primary series, a booster dose should be given every 10 years to maintain immunity against tetanus. Td may also be given as tetanus wound prophylaxis. Tdap Vaccine - COST NOT COVERED BY MEDICARE PART B Recommended at least once for all adults. For pregnant patients, recommended with each pregnancy. Shingles Vaccine (Shingrix) - COST NOT COVERED BY MEDICARE PART B  2 shot series recommended in those aged 48 and above     Health Maintenance Due:  There are no preventive care reminders to display for this patient. Immunizations Due:      Topic Date Due   • COVID-19 Vaccine (1) Never done   • Pneumococcal Vaccine: 65+ Years (1 - PCV) Never done   • Influenza Vaccine (1) 09/01/2023     Advance Directives   What are advance directives? Advance directives are legal documents that state your wishes and plans for medical care. These plans are made ahead of time in case you lose your ability to make decisions for yourself. Advance directives can apply to any medical decision, such as the treatments you want, and if you want to donate organs. What are the types of advance directives? There are many types of advance directives, and each state has rules about how to use them. You may choose a combination of any of the following:  · Living will: This is a written record of the treatment you want. You can also choose which treatments you do not want, which to limit, and which to stop at a certain time. This includes surgery, medicine, IV fluid, and tube feedings. · Durable power of  for healthcare [facility]): This is a written record that states who you want to make healthcare choices for you when you are unable to make them for yourself. This person, called a proxy, is usually a family member or a friend. You may choose more than 1 proxy.   · Do not resuscitate (DNR) order:  A DNR order is used in case your heart stops beating or you stop breathing. It is a request not to have certain forms of treatment, such as CPR. A DNR order may be included in other types of advance directives. · Medical directive: This covers the care that you want if you are in a coma, near death, or unable to make decisions for yourself. You can list the treatments you want for each condition. Treatment may include pain medicine, surgery, blood transfusions, dialysis, IV or tube feedings, and a ventilator (breathing machine). · Values history: This document has questions about your views, beliefs, and how you feel and think about life. This information can help others choose the care that you would choose. Why are advance directives important? An advance directive helps you control your care. Although spoken wishes may be used, it is better to have your wishes written down. Spoken wishes can be misunderstood, or not followed. Treatments may be given even if you do not want them. An advance directive may make it easier for your family to make difficult choices about your care. © Copyright [employer] 2018 Information is for End User's use only and may not be sold, redistributed or otherwise used for commercial purposes. All illustrations and images included in CareNotes® are the copyrighted property of [employer] or [facility] Preventive Visit Patient Instructions  Thank you for completing your Welcome to Medicare Visit or Medicare Annual Wellness Visit today. Your next wellness visit will be due in one year (8/29/2024). The screening/preventive services that you may require over the next 5-10 years are detailed below. Some tests may not apply to you based off risk factors and/or age. Screening tests ordered at today's visit but not completed yet may show as past due. Also, please note that scanned in results may not display below.   Preventive Screenings:  Service Recommendations Previous Testing/Comments   Colorectal Cancer Screening  · Colonoscopy    · Fecal Occult Blood Test (FOBT)/Fecal Immunochemical Test (FIT)  · Fecal DNA/Cologuard Test  · Flexible Sigmoidoscopy Age: 43-73 years old   Colonoscopy: every 10 years (May be performed more frequently if at higher risk)  OR  FOBT/FIT: every 1 year  OR  Cologuard: every 3 years  OR  Sigmoidoscopy: every 5 years  Screening may be recommended earlier than age 39 if at higher risk for colorectal cancer. Also, an individualized decision between you and your healthcare provider will decide whether screening between the ages of 77-80 would be appropriate. Colonoscopy: Not on file  FOBT/FIT: Not on file  Cologuard: Not on file  Sigmoidoscopy: Not on file    Screening Not Indicated     Prostate Cancer Screening Individualized decision between patient and health care provider in men between ages of 53-66   Medicare will cover every 12 months beginning on the day after your 50th birthday PSA: 0.2 ng/mL     History Prostate Cancer  Screening Not Indicated     Hepatitis C Screening Once for adults born between 1945 and 1965  More frequently in patients at high risk for Hepatitis C Hep C Antibody: Not on file        Diabetes Screening 1-2 times per year if you're at risk for diabetes or have pre-diabetes Fasting glucose: 115 mg/dL (8/23/2023)  A1C: 6.2 % (8/23/2023)  Screening Not Indicated  History Diabetes   Cholesterol Screening Once every 5 years if you don't have a lipid disorder. May order more often based on risk factors. Lipid panel: 08/23/2023  Screening Not Indicated  History Lipid Disorder      Other Preventive Screenings Covered by Medicare:  11. Abdominal Aortic Aneurysm (AAA) Screening: covered once if your at risk. You're considered to be at risk if you have a family history of AAA or a male between the age of 70-76 who smoking at least 100 cigarettes in your lifetime.   12. Lung Cancer Screening: covers low dose CT scan once per year if you meet all of the following conditions: (1) Age 48-67; (2) No signs or symptoms of lung cancer; (3) Current smoker or have quit smoking within the last 15 years; (4) You have a tobacco smoking history of at least 20 pack years (packs per day x number of years you smoked); (5) You get a written order from a healthcare provider. 15. Glaucoma Screening: covered annually if you're considered high risk: (1) You have diabetes OR (2) Family history of glaucoma OR (3)  aged 48 and older OR (3)  American aged 72 and older  15. Osteoporosis Screening: covered every 2 years if you meet one of the following conditions: (1) Have a vertebral abnormality; (2) On glucocorticoid therapy for more than 3 months; (3) Have primary hyperparathyroidism; (4) On osteoporosis medications and need to assess response to drug therapy. 15. HIV Screening: covered annually if you're between the age of 14-79. Also covered annually if you are younger than 13 and older than 72 with risk factors for HIV infection. For pregnant patients, it is covered up to 3 times per pregnancy.     Immunizations:  Immunization Recommendations   Influenza Vaccine Annual influenza vaccination during flu season is recommended for all persons aged >= 6 months who do not have contraindications   Pneumococcal Vaccine   * Pneumococcal conjugate vaccine = PCV13 (Prevnar 13), PCV15 (Vaxneuvance), PCV20 (Prevnar 20)  * Pneumococcal polysaccharide vaccine = PPSV23 (Pneumovax) Adults 20-63 years old: 1-3 doses may be recommended based on certain risk factors  Adults 72 years old: 1-2 doses may be recommended based off what pneumonia vaccine you previously received   Hepatitis B Vaccine 3 dose series if at intermediate or high risk (ex: diabetes, end stage renal disease, liver disease)   Tetanus (Td) Vaccine - COST NOT COVERED BY MEDICARE PART B Following completion of primary series, a booster dose should be given every 10 years to maintain immunity against tetanus. Td may also be given as tetanus wound prophylaxis. Tdap Vaccine - COST NOT COVERED BY MEDICARE PART B Recommended at least once for all adults. For pregnant patients, recommended with each pregnancy. Shingles Vaccine (Shingrix) - COST NOT COVERED BY MEDICARE PART B  2 shot series recommended in those aged 48 and above     Health Maintenance Due:  There are no preventive care reminders to display for this patient. Immunizations Due:      Topic Date Due   • COVID-19 Vaccine (1) Never done   • Pneumococcal Vaccine: 65+ Years (1 - PCV) Never done   • Influenza Vaccine (1) 09/01/2023     Advance Directives   What are advance directives? Advance directives are legal documents that state your wishes and plans for medical care. These plans are made ahead of time in case you lose your ability to make decisions for yourself. Advance directives can apply to any medical decision, such as the treatments you want, and if you want to donate organs. What are the types of advance directives? There are many types of advance directives, and each state has rules about how to use them. You may choose a combination of any of the following:  · Living will: This is a written record of the treatment you want. You can also choose which treatments you do not want, which to limit, and which to stop at a certain time. This includes surgery, medicine, IV fluid, and tube feedings. · Durable power of  for healthcare [facility]): This is a written record that states who you want to make healthcare choices for you when you are unable to make them for yourself. This person, called a proxy, is usually a family member or a friend. You may choose more than 1 proxy. · Do not resuscitate (DNR) order:  A DNR order is used in case your heart stops beating or you stop breathing. It is a request not to have certain forms of treatment, such as CPR. A DNR order may be included in other types of advance directives.   · Medical directive: This covers the care that you want if you are in a coma, near death, or unable to make decisions for yourself. You can list the treatments you want for each condition. Treatment may include pain medicine, surgery, blood transfusions, dialysis, IV or tube feedings, and a ventilator (breathing machine). · Values history: This document has questions about your views, beliefs, and how you feel and think about life. This information can help others choose the care that you would choose. Why are advance directives important? An advance directive helps you control your care. Although spoken wishes may be used, it is better to have your wishes written down. Spoken wishes can be misunderstood, or not followed. Treatments may be given even if you do not want them. An advance directive may make it easier for your family to make difficult choices about your care. © Copyright [employer] 2018 Information is for End User's use only and may not be sold, redistributed or otherwise used for commercial purposes. All illustrations and images included in CareNotes® are the copyrighted property of [employer] or [facility] Preventive Visit Patient Instructions  Thank you for completing your Welcome to Medicare Visit or Medicare Annual Wellness Visit today. Your next wellness visit will be due in one year (8/29/2024). The screening/preventive services that you may require over the next 5-10 years are detailed below. Some tests may not apply to you based off risk factors and/or age. Screening tests ordered at today's visit but not completed yet may show as past due. Also, please note that scanned in results may not display below.   Preventive Screenings:  Service Recommendations Previous Testing/Comments   Colorectal Cancer Screening  · Colonoscopy    · Fecal Occult Blood Test (FOBT)/Fecal Immunochemical Test (FIT)  · Fecal DNA/Cologuard Test  · Flexible Sigmoidoscopy Age: 43-73 years old Colonoscopy: every 10 years (May be performed more frequently if at higher risk)  OR  FOBT/FIT: every 1 year  OR  Cologuard: every 3 years  OR  Sigmoidoscopy: every 5 years  Screening may be recommended earlier than age 39 if at higher risk for colorectal cancer. Also, an individualized decision between you and your healthcare provider will decide whether screening between the ages of 77-80 would be appropriate. Colonoscopy: Not on file  FOBT/FIT: Not on file  Cologuard: Not on file  Sigmoidoscopy: Not on file    Screening Not Indicated     Prostate Cancer Screening Individualized decision between patient and health care provider in men between ages of 53-66   Medicare will cover every 12 months beginning on the day after your 50th birthday PSA: 0.2 ng/mL     History Prostate Cancer  Screening Not Indicated     Hepatitis C Screening Once for adults born between 1945 and 1965  More frequently in patients at high risk for Hepatitis C Hep C Antibody: Not on file        Diabetes Screening 1-2 times per year if you're at risk for diabetes or have pre-diabetes Fasting glucose: 115 mg/dL (8/23/2023)  A1C: 6.2 % (8/23/2023)  Screening Not Indicated  History Diabetes   Cholesterol Screening Once every 5 years if you don't have a lipid disorder. May order more often based on risk factors. Lipid panel: 08/23/2023  Screening Not Indicated  History Lipid Disorder      Other Preventive Screenings Covered by Medicare:  16. Abdominal Aortic Aneurysm (AAA) Screening: covered once if your at risk. You're considered to be at risk if you have a family history of AAA or a male between the age of 70-76 who smoking at least 100 cigarettes in your lifetime.   17. Lung Cancer Screening: covers low dose CT scan once per year if you meet all of the following conditions: (1) Age 48-67; (2) No signs or symptoms of lung cancer; (3) Current smoker or have quit smoking within the last 15 years; (4) You have a tobacco smoking history of at least 20 pack years (packs per day x number of years you smoked); (5) You get a written order from a healthcare provider. 25. Glaucoma Screening: covered annually if you're considered high risk: (1) You have diabetes OR (2) Family history of glaucoma OR (3)  aged 48 and older OR (3)  American aged 72 and older  23. Osteoporosis Screening: covered every 2 years if you meet one of the following conditions: (1) Have a vertebral abnormality; (2) On glucocorticoid therapy for more than 3 months; (3) Have primary hyperparathyroidism; (4) On osteoporosis medications and need to assess response to drug therapy. 20. HIV Screening: covered annually if you're between the age of 14-79. Also covered annually if you are younger than 13 and older than 72 with risk factors for HIV infection. For pregnant patients, it is covered up to 3 times per pregnancy. Immunizations:  Immunization Recommendations   Influenza Vaccine Annual influenza vaccination during flu season is recommended for all persons aged >= 6 months who do not have contraindications   Pneumococcal Vaccine   * Pneumococcal conjugate vaccine = PCV13 (Prevnar 13), PCV15 (Vaxneuvance), PCV20 (Prevnar 20)  * Pneumococcal polysaccharide vaccine = PPSV23 (Pneumovax) Adults 20-63 years old: 1-3 doses may be recommended based on certain risk factors  Adults 72 years old: 1-2 doses may be recommended based off what pneumonia vaccine you previously received   Hepatitis B Vaccine 3 dose series if at intermediate or high risk (ex: diabetes, end stage renal disease, liver disease)   Tetanus (Td) Vaccine - COST NOT COVERED BY MEDICARE PART B Following completion of primary series, a booster dose should be given every 10 years to maintain immunity against tetanus. Td may also be given as tetanus wound prophylaxis. Tdap Vaccine - COST NOT COVERED BY MEDICARE PART B Recommended at least once for all adults.  For pregnant patients, recommended with each pregnancy. Shingles Vaccine (Shingrix) - COST NOT COVERED BY MEDICARE PART B  2 shot series recommended in those aged 48 and above     Health Maintenance Due:  There are no preventive care reminders to display for this patient. Immunizations Due:      Topic Date Due   • COVID-19 Vaccine (1) Never done   • Pneumococcal Vaccine: 65+ Years (1 - PCV) Never done   • Influenza Vaccine (1) 09/01/2023     Advance Directives   What are advance directives? Advance directives are legal documents that state your wishes and plans for medical care. These plans are made ahead of time in case you lose your ability to make decisions for yourself. Advance directives can apply to any medical decision, such as the treatments you want, and if you want to donate organs. What are the types of advance directives? There are many types of advance directives, and each state has rules about how to use them. You may choose a combination of any of the following:  · Living will: This is a written record of the treatment you want. You can also choose which treatments you do not want, which to limit, and which to stop at a certain time. This includes surgery, medicine, IV fluid, and tube feedings. · Durable power of  for healthcare [facility]): This is a written record that states who you want to make healthcare choices for you when you are unable to make them for yourself. This person, called a proxy, is usually a family member or a friend. You may choose more than 1 proxy. · Do not resuscitate (DNR) order:  A DNR order is used in case your heart stops beating or you stop breathing. It is a request not to have certain forms of treatment, such as CPR. A DNR order may be included in other types of advance directives. · Medical directive: This covers the care that you want if you are in a coma, near death, or unable to make decisions for yourself. You can list the treatments you want for each condition.  Treatment may include pain medicine, surgery, blood transfusions, dialysis, IV or tube feedings, and a ventilator (breathing machine). · Values history: This document has questions about your views, beliefs, and how you feel and think about life. This information can help others choose the care that you would choose. Why are advance directives important? An advance directive helps you control your care. Although spoken wishes may be used, it is better to have your wishes written down. Spoken wishes can be misunderstood, or not followed. Treatments may be given even if you do not want them. An advance directive may make it easier for your family to make difficult choices about your care. © Copyright [employer] 2018 Information is for End User's use only and may not be sold, redistributed or otherwise used for commercial purposes.  All illustrations and images included in CareNotes® are the copyrighted property of [employer]M., Inc[employer] or [address] [place] [address]

## 2023-08-29 NOTE — PROGRESS NOTES
Assessment and Plan:     Problem List Items Addressed This Visit        Endocrine    Type 2 diabetes mellitus with stage 3b chronic kidney disease, without long-term current use of insulin ([address])       Cardiovascular and Mediastinum    Essential hypertension       Genitourinary    Stage 3a chronic kidney disease ([address]) - Primary    Kidney cyst, acquired   Other Visit Diagnoses     Medicare annual wellness visit, subsequent               Preventive health issues were discussed with patient, and age appropriate screening tests were ordered as noted in patient's After Visit Summary. Personalized health advice and appropriate referrals for health education or preventive services given if needed, as noted in patient's After Visit Summary. History of Present Illness:     Patient presents for a Medicare Wellness Visit    HPI   Patient Care Team:  Sujatha Hawkins DO as PCP - General (Family Medicine)  Juan Francisco PA-C as Physician Assistant (Nephrology)     Review of Systems:     Review of Systems     Problem List:     Patient Active Problem List   Diagnosis   • Type 2 diabetes mellitus with stage 3b chronic kidney disease, without long-term current use of insulin ([address])   • Essential hypertension   • Hypercholesterolemia   • Overweight (BMI 25.0-29. 9)   • Negative depression screening   • Encounter for diabetic foot exam ([address])   • RBC microcytosis   • Stage 3a chronic kidney disease (HCC)   • Kidney cyst, acquired   • Vitamin D deficiency   • Secondary hyperparathyroidism (HCC)   • SOB (shortness of breath)      Past Medical and Surgical History:     Past Medical History:   Diagnosis Date   • Aortic aneurysm (HCC)    • Back pain    • Carotid stenosis    • Prostate cancer [facility])      Past Surgical History:   Procedure Laterality Date   • OTHER SURGICAL HISTORY  2005    prostate cancer S/P   • THORACIC AORTIC ANEURYSM REPAIR        Family History:     Family History   Problem Relation Age of Onset   • Cancer Family    • Diabetes Family       Social History:     Social History     Socioeconomic History   • Marital status: /Civil Union     Spouse name: None   • Number of children: None   • Years of education: None   • Highest education level: None   Occupational History   • None   Tobacco Use   • Smoking status: Never   • Smokeless tobacco: Never   Substance and Sexual Activity   • Alcohol use: Yes   • Drug use: No   • Sexual activity: None   Other Topics Concern   • None   Social History Narrative    Influenza 10/7/11 was refused. Social Determinants of Health     Financial Resource Strain: Not on file   Food Insecurity: Not on file   Transportation Needs: Not on file   Physical Activity: Not on file   Stress: Not on file   Social Connections: Not on file   Intimate Partner Violence: Not on file   Housing Stability: Not on file      Medications and Allergies:     Current Outpatient Medications   Medication Sig Dispense Refill   • amLODIPine (NORVASC) 10 mg tablet Take 1 tablet by mouth once daily 90 tablet 0   • aspirin (ECOTRIN LOW STRENGTH) 81 mg EC tablet Take 81 mg by mouth daily     • CINNAMON PO Take by mouth daily     • doxazosin (CARDURA) 2 mg tablet Take 1 tablet (2 mg total) by mouth daily at bedtime 90 tablet 3   • lisinopril (ZESTRIL) 20 mg tablet Take 1 tablet (20 mg total) by mouth daily 90 tablet 2   • metFORMIN (GLUCOPHAGE) 500 mg tablet Take 1 tablet (500 mg total) by mouth 2 (two) times a day with meals 180 tablet 2   • simvastatin (ZOCOR) 20 mg tablet TAKE 1 TABLET BY MOUTH ONCE DAILY AT BEDTIME 30 tablet 0     No current facility-administered medications for this visit. No Known Allergies   Immunizations:     Immunization History   Administered Date(s) Administered   • INFLUENZA 10/07/2011      Health Maintenance: There are no preventive care reminders to display for this patient.       Topic Date Due   • COVID-19 Vaccine (1) Never done   • Pneumococcal Vaccine: 65+ Years (1 - PCV) Never done   • Influenza Vaccine (1) 09/01/2023      Medicare Screening Tests and Risk Assessments:     Rhonda Lee is here for his Subsequent Wellness visit. Last Medicare Wellness visit information reviewed, patient interviewed and updates made to the record today. Health Risk Assessment:   Patient rates overall health as good. Patient feels that their physical health rating is slightly worse. Patient is satisfied with their life. Eyesight was rated as same. Hearing was rated as slightly worse. Patient feels that their emotional and mental health rating is same. Patients states they are never, rarely angry. Patient states they are never, rarely unusually tired/fatigued. Pain experienced in the last 7 days has been none. Patient states that he has experienced no weight loss or gain in last 6 months. Depression Screening:   PHQ-2 Score: 0      Fall Risk Screening: In the past year, patient has experienced: no history of falling in past year      Home Safety:  Patient does not have trouble with stairs inside or outside of their home. Patient has working smoke alarms and has working carbon monoxide detector. Home safety hazards include: none. Nutrition:   Current diet is Regular. Medications:   Patient is currently taking over-the-counter supplements. OTC medications include: see medication list. Patient is able to manage medications. Activities of Daily Living (ADLs)/Instrumental Activities of Daily Living (IADLs):   Walk and transfer into and out of bed and chair?: Yes  Dress and groom yourself?: Yes    Bathe or shower yourself?: Yes    Feed yourself? Yes  Do your laundry/housekeeping?: Yes  Manage your money, pay your bills and track your expenses?: Yes  Make your own meals?: Yes    Do your own shopping?: Yes    Previous Hospitalizations:   Any hospitalizations or ED visits within the last 12 months?: No      Advance Care Planning:   Living will: Yes    Durable POA for healthcare:  Yes Advanced directive: Yes    Five wishes given: No      PREVENTIVE SCREENINGS      Cardiovascular Screening:    General: Screening Not Indicated and History Lipid Disorder      Diabetes Screening:     General: Screening Not Indicated and History Diabetes      Colorectal Cancer Screening:     General: Screening Not Indicated      Prostate Cancer Screening:    General: History Prostate Cancer and Screening Not Indicated      Lung Cancer Screening:     General: Screening Not Indicated    Screening, Brief Intervention, and Referral to Treatment (SBIRT)    Screening      Single Item Drug Screening:  How often have you used an illegal drug (including marijuana) or a prescription medication for non-medical reasons in the past year? never    Single Item Drug Screen Score: 0  Interpretation: Negative screen for possible drug use disorder    No results found.      Physical Exam:     Ht 5' 5" (1.651 m)   Wt 66.2 kg (146 lb)   BMI 24.30 kg/m²     Physical Exam     Shyam Ferrell MD

## 2023-08-29 NOTE — PROGRESS NOTES
Assessment and Plan:     Problem List Items Addressed This Visit        Endocrine    Type 2 diabetes mellitus with stage 3b chronic kidney disease, without long-term current use of insulin (HCC)    Relevant Medications    metFORMIN (GLUCOPHAGE) 500 mg tablet       Cardiovascular and Mediastinum    Essential hypertension       Genitourinary    Stage 3a chronic kidney disease ([address])    Kidney cyst, acquired   Other Visit Diagnoses     Medicare annual wellness visit, subsequent    -  Primary    Screening for prostate cancer            Patient is here for annual wellness visit. He is a new patient to me. Regarding patient's diabetes his hemoglobin A1c on August 23 was 6.2. This is overcorrected for his age. The goal will be around 7. Patient currently taking metformin 500 mg twice daily. I will decrease that to 500 mg daily. .  Asked patient to follow-up healthy diet. If after 6 months his hemoglobin A1c is around 6.5 I will discontinue metformin. Patient has chronic kidney disease and following up with nephrology. Goes to gym 3 times daily and eats healthy diet. Denies lightheadedness, dizziness, fall. Blood pressure:   he is taking Norvasc 10 mg daily and lisinopril 20 mg daily. His blood pressure today in the clinic is soft for his age. Asked patient to check his blood pressure every other day for 6 weeks. I will see him after 6 weeks and review blood pressure log if his blood pressure is still around 120s, I will discontinue Norvasc and/or increase lisinopril to max dose 40 mg. Patient will come back in 6 weeks to check his blood pressure with the nurse. Denies any dizziness or lightheadedness. Patient reports that he goes to the gym 3 times per week for almost an hour. Patient [facility] in [facility]. Does not see podiatry. He does not have any numbness tingling or weakness in his lower extremities. Denies smoking, recreational drugs. Alcohol occasionally.   He is following up with nephrology. Depression Screening and Follow-up Plan: Patient was screened for depression during today's encounter. They screened negative with a PHQ-2 score of 0. Preventive health issues were discussed with patient, and age appropriate screening tests were ordered as noted in patient's After Visit Summary. Personalized health advice and appropriate referrals for health education or preventive services given if needed, as noted in patient's After Visit Summary. History of Present Illness:     Patient presents for a Medicare Wellness Visit    HPI   Patient Care Team:  Katia Jerome DO as PCP - General (Family Medicine)  Pam Moulton PA-C as Physician Assistant (Nephrology)     Review of Systems:     Review of Systems     Problem List:     Patient Active Problem List   Diagnosis   • Type 2 diabetes mellitus with stage 3b chronic kidney disease, without long-term current use of insulin ([address])   • Essential hypertension   • Hypercholesterolemia   • Overweight (BMI 25.0-29. 9)   • Negative depression screening   • Encounter for diabetic foot exam ([address])   • RBC microcytosis   • Stage 3a chronic kidney disease (HCC)   • Kidney cyst, acquired   • Vitamin D deficiency   • Secondary hyperparathyroidism (HCC)   • SOB (shortness of breath)      Past Medical and Surgical History:     Past Medical History:   Diagnosis Date   • Aortic aneurysm (HCC)    • Back pain    • Carotid stenosis    • Prostate cancer [facility])      Past Surgical History:   Procedure Laterality Date   • OTHER SURGICAL HISTORY  2005    prostate cancer S/P   • THORACIC AORTIC ANEURYSM REPAIR        Family History:     Family History   Problem Relation Age of Onset   • Cancer Family    • Diabetes Family       Social History:     Social History     Socioeconomic History   • Marital status: /Civil Union     Spouse name: None   • Number of children: None   • Years of education: None   • Highest education level: None Occupational History   • None   Tobacco Use   • Smoking status: Never   • Smokeless tobacco: Never   Substance and Sexual Activity   • Alcohol use: Yes   • Drug use: No   • Sexual activity: None   Other Topics Concern   • None   Social History Narrative    Influenza 10/7/11 was refused. Social Determinants of Health     Financial Resource Strain: Not on file   Food Insecurity: Not on file   Transportation Needs: Not on file   Physical Activity: Not on file   Stress: Not on file   Social Connections: Not on file   Intimate Partner Violence: Not on file   Housing Stability: Not on file      Medications and Allergies:     Current Outpatient Medications   Medication Sig Dispense Refill   • amLODIPine (NORVASC) 10 mg tablet Take 1 tablet by mouth once daily 90 tablet 0   • aspirin (ECOTRIN LOW STRENGTH) 81 mg EC tablet Take 81 mg by mouth daily     • CINNAMON PO Take by mouth daily     • doxazosin (CARDURA) 2 mg tablet Take 1 tablet (2 mg total) by mouth daily at bedtime 90 tablet 3   • lisinopril (ZESTRIL) 20 mg tablet Take 1 tablet (20 mg total) by mouth daily 90 tablet 2   • metFORMIN (GLUCOPHAGE) 500 mg tablet Take 1 tablet (500 mg total) by mouth daily with breakfast 30 tablet 6   • simvastatin (ZOCOR) 20 mg tablet TAKE 1 TABLET BY MOUTH ONCE DAILY AT BEDTIME 30 tablet 0     No current facility-administered medications for this visit. No Known Allergies   Immunizations:     Immunization History   Administered Date(s) Administered   • INFLUENZA 10/07/2011      Health Maintenance: There are no preventive care reminders to display for this patient. Topic Date Due   • COVID-19 Vaccine (1) Never done   • Pneumococcal Vaccine: 65+ Years (1 - PCV) Never done   • Influenza Vaccine (1) 09/01/2023      Medicare Screening Tests and Risk Assessments:     Annual Wellness Visit  No results found.      Physical Exam:     /62   Pulse 82   Temp 97.8 °F (36.6 °C)   Ht 5' 5" (1.651 m)   Wt 66.2 kg (146 lb) SpO2 97%   BMI 24.30 kg/m²     Physical Exam  Vitals and nursing note reviewed. Constitutional:       General: He is not in acute distress. Appearance: He is well-developed. HENT:      Head: Normocephalic and atraumatic. Eyes:      Conjunctiva/sclera: Conjunctivae normal.   Cardiovascular:      Rate and Rhythm: Normal rate and regular rhythm. Pulses: Normal pulses. Heart sounds: Normal heart sounds. No murmur heard. Pulmonary:      Effort: Pulmonary effort is normal. No respiratory distress. Breath sounds: Normal breath sounds. Abdominal:      Palpations: Abdomen is soft. Tenderness: There is no abdominal tenderness. Musculoskeletal:         General: No swelling. Cervical back: Neck supple. Right lower leg: No edema. Left lower leg: No edema. Skin:     General: Skin is warm and dry. Capillary Refill: Capillary refill takes less than 2 seconds. Neurological:      General: No focal deficit present. Mental Status: He is alert and oriented to person, place, and time. Psychiatric:         Mood and Affect: Mood normal.         Thought Content:  Thought content normal.         Judgment: Judgment normal.          John Thompson MD

## 2023-09-05 DIAGNOSIS — I10 HYPERTENSION, UNSPECIFIED TYPE: ICD-10-CM

## 2023-09-05 DIAGNOSIS — E78.5 HYPERLIPIDEMIA, UNSPECIFIED HYPERLIPIDEMIA TYPE: ICD-10-CM

## 2023-09-05 RX ORDER — AMLODIPINE BESYLATE 10 MG/1
10 TABLET ORAL DAILY
Qty: 90 TABLET | Refills: 1 | Status: SHIPPED | OUTPATIENT
Start: 2023-09-05

## 2023-09-05 RX ORDER — SIMVASTATIN 20 MG
20 TABLET ORAL
Qty: 30 TABLET | Refills: 1 | Status: SHIPPED | OUTPATIENT
Start: 2023-09-05

## 2023-09-11 ENCOUNTER — OFFICE VISIT (OUTPATIENT)
Dept: SURGERY | Facility: CLINIC | Age: 88
End: 2023-09-11
Payer: MEDICARE

## 2023-09-11 VITALS
BODY MASS INDEX: 24.66 KG/M2 | SYSTOLIC BLOOD PRESSURE: 136 MMHG | HEIGHT: 65 IN | OXYGEN SATURATION: 98 % | TEMPERATURE: 97.8 F | WEIGHT: 148 LBS | DIASTOLIC BLOOD PRESSURE: 60 MMHG | HEART RATE: 100 BPM | RESPIRATION RATE: 16 BRPM

## 2023-09-11 DIAGNOSIS — Z01.818 PREOPERATIVE TESTING: Primary | ICD-10-CM

## 2023-09-11 DIAGNOSIS — K40.30 INCARCERATED LEFT INGUINAL HERNIA: ICD-10-CM

## 2023-09-11 DIAGNOSIS — K59.00 CONSTIPATION: ICD-10-CM

## 2023-09-11 DIAGNOSIS — Z01.818 PREOPERATIVE CLEARANCE: ICD-10-CM

## 2023-09-11 DIAGNOSIS — K40.90 RIGHT INGUINAL HERNIA: ICD-10-CM

## 2023-09-11 PROCEDURE — 99204 OFFICE O/P NEW MOD 45 MIN: CPT | Performed by: PHYSICIAN ASSISTANT

## 2023-09-11 RX ORDER — CHLORHEXIDINE GLUCONATE 4 G/100ML
SOLUTION TOPICAL DAILY PRN
OUTPATIENT
Start: 2023-09-11

## 2023-09-11 RX ORDER — SODIUM CHLORIDE, SODIUM LACTATE, POTASSIUM CHLORIDE, CALCIUM CHLORIDE 600; 310; 30; 20 MG/100ML; MG/100ML; MG/100ML; MG/100ML
100 INJECTION, SOLUTION INTRAVENOUS CONTINUOUS
OUTPATIENT
Start: 2023-09-11

## 2023-09-11 NOTE — ASSESSMENT & PLAN NOTE
Advised treatment of constipation with stool softener and/or gentle laxative to reduce straining in light of his acutely incarcerate [facility].

## 2023-09-11 NOTE — H&P
H&P Exam - General Surgery   Dyann Denver 82 y.o. male MRN: [id number]   Encounter: [id number]    Assessment/Plan    Right inguinal hernia  Incidental asymptomatic right inguinal hernia appreciated on exam. Would advise repair at time of left inguinal hernia repair. Agreeable to this plan. Incarcerated left inguinal hernia  Presents with signs/symptoms of acutely incarcerated fat containing left inguinal hernia x 1 week. On exam moderate sized and able to be reduce with gentle steady pressure. No signs to suggest bowel hernia, obstruction, or strangulation. Nontoxic appearing on exam. Advised no lifting/exertion until it can be repaired. Recommendation made for open left inguinal hernia repair with mesh. Will be done as a bilateral repair as he has an asymptomatic RIH. Details and risks of the procedure including anesthesia, bleeding, infection, mesh, recurrence, and pain discussed. Informed written consent obtained. Will request medical clearance and pre-operative CXR/EKG. Constipation  Advised treatment of constipation with stool softener and/or gentle laxative to reduce straining in light of his acutely incarcerate LIH. Diagnoses and all orders for this visit:    Preoperative testing  -     X-ray chest 2 views; Future  -     ECG 12 lead; Future    Preoperative clearance  -     Ambulatory Referral to [facility]; Future    Incarcerated left inguinal hernia    Right inguinal hernia    Constipation        History of Present Illness   Chief Complaint   Patient presents with   • Hernia     Hernia       Active 83 yo M with hx CKD, prostate CA s/p radiation, and prior AAA repair presents for left groin pain/lump x 1 week with associated constipation. No N/V or fever. No prior hernias. Review of Systems   All other systems reviewed and are negative.       Historical Information   Past Medical History:   Diagnosis Date   • Aortic aneurysm [facility])    • Back pain    • Carotid stenosis    • Prostate cancer [facility])      Past Surgical History:   Procedure Laterality Date   • OTHER SURGICAL HISTORY  2005    prostate cancer S/P   • THORACIC AORTIC ANEURYSM REPAIR       Social History   Social History     Substance and Sexual Activity   Alcohol Use Yes     Social History     Substance and Sexual Activity   Drug Use No     Social History     Tobacco Use   Smoking Status Never   Smokeless Tobacco Never     Family History   Problem Relation Age of Onset   • Cancer Family    • Diabetes Family        Meds/Allergies     Current Outpatient Medications:   •  amLODIPine (NORVASC) 10 mg tablet, Take 1 tablet (10 mg total) by mouth daily, Disp: 90 tablet, Rfl: 1  •  aspirin (ECOTRIN LOW STRENGTH) 81 mg EC tablet, Take 81 mg by mouth daily, Disp: , Rfl:   •  CINNAMON PO, Take by mouth daily, Disp: , Rfl:   •  doxazosin (CARDURA) 2 mg tablet, Take 1 tablet (2 mg total) by mouth daily at bedtime, Disp: 90 tablet, Rfl: 3  •  lisinopril (ZESTRIL) 20 mg tablet, Take 1 tablet (20 mg total) by mouth daily, Disp: 90 tablet, Rfl: 2  •  metFORMIN (GLUCOPHAGE) 500 mg tablet, Take 1 tablet (500 mg total) by mouth daily with breakfast, Disp: 30 tablet, Rfl: 6  •  simvastatin (ZOCOR) 20 mg tablet, Take 1 tablet (20 mg total) by mouth daily at bedtime, Disp: 30 tablet, Rfl: 1  No Known Allergies    The following portions of the patient's history were reviewed and updated as appropriate: allergies, current medications, past family history, past medical history, past social history, past surgical history and problem list.    Objective   Current Vitals:   Blood pressure 136/60, pulse 100, temperature 97.8 °F (36.6 °C), temperature source Temporal, resp. rate 16, height 5' 5" (1.651 m), weight 67.1 kg (148 lb), SpO2 98 %. Physical Exam  Constitutional:       General: He is not in acute distress. Appearance: He is well-developed. He is not diaphoretic. HENT:      Head: Normocephalic and atraumatic.    Eyes:      Pupils: Pupils are equal, round, and reactive to light. Pulmonary:      Effort: No respiratory distress. Abdominal:      Comments: Soft, non-tender, laparotomy scar without obvious incisional hernia. Genitourinary:     Comments: Moderate soft asymptomatic RIH, reducible. Moderate firm tender LIH, incarcerated but able to be reduced. Musculoskeletal:         General: Normal range of motion. Cervical back: Normal range of motion. Skin:     General: Skin is warm and dry. Neurological:      Mental Status: He is alert and oriented to person, place, and time.          Signature:  Andrew Rojo PA-C  Date: 9/11/2023 Time: 2:45 PM

## 2023-09-11 NOTE — ASSESSMENT & PLAN NOTE
Your Child's Health  18-Month-Old Visit      Kwabena Spencer  Key points at this age…  • Kwabena Presents with signs/symptoms of acutely incarcerated fat containing left inguinal hernia x 1 week. On exam moderate sized and able to be reduce with gentle steady pressure. No signs to suggest bowel hernia, obstruction, or strangulation. Nontoxic appearing on exam. Advised no lifting/exertion until it can be repaired. Recommendation made for open left inguinal hernia repair with mesh. Will be done as a bilateral repair as he has an asymptomatic RIH. Details and risks of the procedure including anesthesia, bleeding, infection, mesh, recurrence, and pain discussed. Informed written consent obtained. Will request medical clearance and pre-operative CXR/EKG. sippy cup that they can carry around and drink from over an extended period of time; this frequent exposure to the sugars in juice (even if diluted with water) just increases their risk of tooth decay.       Don’t push your children to eat when they do not want to. Offer them small portions at a time; they can have seconds when they eat everything they were offered. Serving large portions may result in eating more than they need (and waste!).      Toddlers often resist new foods, but keep trying! Most will eventually try something new after it is offered several times. (Don’t get into the habit of serving only what they like!)    Even when eating a very well balanced diet, children need some extra vitamin D. A liquid preparation of pure vitamin D (400 or 600 IU) or a multivitamin with iron drop is recommended.  (They are currently too young for a chewable vitamin because they could choke on those.)     DENTAL CARE:   By now you should be brushing your child’s teeth twice daily, especially at bedtime. (It’s okay if they think you are “just helping” them!) Brush with a tiny smear of regular (fluoridated) toothpaste (not baby toothpaste).  Using city water to drink and cook with provides important fluoride to strengthen and protect teeth against cavities. If you have well water instead of a city water supply, however, you should have it tested for fluoride content to determine whether your child might need fluoride supplements.     DEVELOPMENT & BEHAVIOR:  Toddlers will be learning lots of new skills at this age and over the next several months! They should be walking well and often running; most will try to climb stairs (if you let them!). They may help get themselves undressed, scribble, and use a cup and spoon fairly well. They should have at least 6 words, will gesture and point to show someone what they want. They will “pretend” their dolls or stuffed animals are real, and they will hopefully know the name of  baby’s language skills!      The American [employer] or [employer]).  12 months. The [facility] Health Department Medicaid or WIC. Very rarely, other sources of lead exposure can include herbal remedies or imported items (mini blinds, canned goods). Do an internet search for “[facility] Department Lead” for helpful information about lead risks in [place]. If you have questions about older neighborhoods in [place] that might have lead connecting (or service) pipes, do an internet search for \"[place] lead awareness and drinking water safety\". From this web page, you can search for your address to find out if your home was built with lead service lines. There are also helpful hints regarding water safety on this site.     SUN EXPOSURE:  Keep your child in shade and out of direct sun as much as you can. Protective clothing as well as hats and sunglasses help protect against sunburn and skin damage. When your child is going to be outside, always use a “broad spectrum” sunscreen (which means it protects against both UVA and UVB rays) with an SPF of 15 to 30; apply it to your child’s skin at least 20 to 30 minutes before they go out. Kids this age often like the zinc oxide (or titanium dioxide) products which are good for sensitive spots (nose, cheeks, ears, shoulders); these don’t “rub in” all the way, but kids often like the fun colors they come in!      MEDICATION FOR FEVER OR PAIN:   Acetaminophen liquid (e.g., Tylenol or Tempra) may be given every four hours as needed for pain or fever.  Acetaminophen liquid is less concentrated than the infant dropper bottle type.  Be sure to check which product CONCENTRATION you are using.    INFANT Tylenol/Acetaminophen  Drops (160 mg/5 mL)    Child’s Weight: Dose:  24 - 35 pounds:   160 mg (5.0 mL (1 Teaspoon))    CHILDREN’S Tylenol/Acetaminophen  (160 mg/5 mL)    Child’s Weight: Dose:  24 - 35 pounds:   160 mg (5.0 mL (1 Teaspoon))    CHILDREN'S Ibuprofen (100 mg/5 mL) liquid (for example, Advil or Motrin) may be given every 6 hours as needed for pain or fever.    Child’s  [facility].      Also, check out “Children’s Health” on the [facility] Blog for updates on timely topics regarding children’s health!

## 2023-09-11 NOTE — ASSESSMENT & PLAN NOTE
Incidental asymptomatic right inguinal hernia appreciated on exam. Would advise repair at time of left inguinal hernia repair. Agreeable to this plan.

## 2023-09-11 NOTE — H&P (VIEW-ONLY)
H&P Exam - General Surgery   Jamie Olivia 82 y.o. male MRN: [id number]   Encounter: [id number]    Assessment/Plan    Right inguinal hernia  Incidental asymptomatic right inguinal hernia appreciated on exam. Would advise repair at time of left inguinal hernia repair. Agreeable to this plan. Incarcerated left inguinal hernia  Presents with signs/symptoms of acutely incarcerated fat containing left inguinal hernia x 1 week. On exam moderate sized and able to be reduce with gentle steady pressure. No signs to suggest bowel hernia, obstruction, or strangulation. Nontoxic appearing on exam. Advised no lifting/exertion until it can be repaired. Recommendation made for open left inguinal hernia repair with mesh. Will be done as a bilateral repair as he has an asymptomatic RIH. Details and risks of the procedure including anesthesia, bleeding, infection, mesh, recurrence, and pain discussed. Informed written consent obtained. Will request medical clearance and pre-operative CXR/EKG. Constipation  Advised treatment of constipation with stool softener and/or gentle laxative to reduce straining in light of his acutely incarcerate LIH. Diagnoses and all orders for this visit:    Preoperative testing  -     X-ray chest 2 views; Future  -     ECG 12 lead; Future    Preoperative clearance  -     Ambulatory Referral to [facility]; Future    Incarcerated left inguinal hernia    Right inguinal hernia    Constipation        History of Present Illness   Chief Complaint   Patient presents with   • Hernia     Hernia       Active 83 yo M with hx CKD, prostate CA s/p radiation, and prior AAA repair presents for left groin pain/lump x 1 week with associated constipation. No N/V or fever. No prior hernias. Review of Systems   All other systems reviewed and are negative.       Historical Information   Past Medical History:   Diagnosis Date   • Aortic aneurysm [facility])    • Back pain    • Carotid stenosis    • Prostate cancer [facility])      Past Surgical History:   Procedure Laterality Date   • OTHER SURGICAL HISTORY  2005    prostate cancer S/P   • THORACIC AORTIC ANEURYSM REPAIR       Social History   Social History     Substance and Sexual Activity   Alcohol Use Yes     Social History     Substance and Sexual Activity   Drug Use No     Social History     Tobacco Use   Smoking Status Never   Smokeless Tobacco Never     Family History   Problem Relation Age of Onset   • Cancer Family    • Diabetes Family        Meds/Allergies     Current Outpatient Medications:   •  amLODIPine (NORVASC) 10 mg tablet, Take 1 tablet (10 mg total) by mouth daily, Disp: 90 tablet, Rfl: 1  •  aspirin (ECOTRIN LOW STRENGTH) 81 mg EC tablet, Take 81 mg by mouth daily, Disp: , Rfl:   •  CINNAMON PO, Take by mouth daily, Disp: , Rfl:   •  doxazosin (CARDURA) 2 mg tablet, Take 1 tablet (2 mg total) by mouth daily at bedtime, Disp: 90 tablet, Rfl: 3  •  lisinopril (ZESTRIL) 20 mg tablet, Take 1 tablet (20 mg total) by mouth daily, Disp: 90 tablet, Rfl: 2  •  metFORMIN (GLUCOPHAGE) 500 mg tablet, Take 1 tablet (500 mg total) by mouth daily with breakfast, Disp: 30 tablet, Rfl: 6  •  simvastatin (ZOCOR) 20 mg tablet, Take 1 tablet (20 mg total) by mouth daily at bedtime, Disp: 30 tablet, Rfl: 1  No Known Allergies    The following portions of the patient's history were reviewed and updated as appropriate: allergies, current medications, past family history, past medical history, past social history, past surgical history and problem list.    Objective   Current Vitals:   Blood pressure 136/60, pulse 100, temperature 97.8 °F (36.6 °C), temperature source Temporal, resp. rate 16, height 5' 5" (1.651 m), weight 67.1 kg (148 lb), SpO2 98 %. Physical Exam  Constitutional:       General: He is not in acute distress. Appearance: He is well-developed. He is not diaphoretic. HENT:      Head: Normocephalic and atraumatic.    Eyes:      Pupils: Pupils are equal, round, and reactive to light. Pulmonary:      Effort: No respiratory distress. Abdominal:      Comments: Soft, non-tender, laparotomy scar without obvious incisional hernia. Genitourinary:     Comments: Moderate soft asymptomatic RIH, reducible. Moderate firm tender LIH, incarcerated but able to be reduced. Musculoskeletal:         General: Normal range of motion. Cervical back: Normal range of motion. Skin:     General: Skin is warm and dry. Neurological:      Mental Status: He is alert and oriented to person, place, and time.          Signature:  Andrew Rojo PA-C  Date: 9/11/2023 Time: 2:45 PM

## 2023-09-11 NOTE — PROGRESS NOTES
Patient is here for a LI that he has been dealing with since last Thursday. Patient states there bulging and shooting pains with certain movements. Patient has also been constipated since last Thursday as well but denies any issues with urination, trouble eating/drinking, abd pain or fevers/chills. Mike. ARGELIA,MA

## 2023-09-14 ENCOUNTER — HOSPITAL ENCOUNTER (OUTPATIENT)
Dept: RADIOLOGY | Facility: HOSPITAL | Age: 88
End: 2023-09-14
Payer: MEDICARE

## 2023-09-14 ENCOUNTER — OFFICE VISIT (OUTPATIENT)
Dept: LAB | Facility: HOSPITAL | Age: 88
End: 2023-09-14
Payer: MEDICARE

## 2023-09-14 DIAGNOSIS — Z01.818 PREOPERATIVE TESTING: ICD-10-CM

## 2023-09-14 LAB
ATRIAL RATE: 72 BPM
P AXIS: 54 DEGREES
PR INTERVAL: 182 MS
QRS AXIS: 15 DEGREES
QRSD INTERVAL: 96 MS
QT INTERVAL: 394 MS
QTC INTERVAL: 431 MS
T WAVE AXIS: 49 DEGREES
VENTRICULAR RATE: 72 BPM

## 2023-09-14 PROCEDURE — 93005 ELECTROCARDIOGRAM TRACING: CPT

## 2023-09-14 PROCEDURE — 71046 X-RAY EXAM CHEST 2 VIEWS: CPT

## 2023-09-14 PROCEDURE — 93010 ELECTROCARDIOGRAM REPORT: CPT | Performed by: INTERNAL MEDICINE

## 2023-09-19 ENCOUNTER — CONSULT (OUTPATIENT)
Dept: FAMILY MEDICINE CLINIC | Facility: CLINIC | Age: 88
End: 2023-09-19
Payer: MEDICARE

## 2023-09-19 VITALS
SYSTOLIC BLOOD PRESSURE: 138 MMHG | HEART RATE: 72 BPM | HEIGHT: 65 IN | WEIGHT: 145 LBS | OXYGEN SATURATION: 97 % | DIASTOLIC BLOOD PRESSURE: 64 MMHG | BODY MASS INDEX: 24.16 KG/M2 | TEMPERATURE: 98 F

## 2023-09-19 DIAGNOSIS — K40.20 NON-RECURRENT BILATERAL INGUINAL HERNIA WITHOUT OBSTRUCTION OR GANGRENE: Primary | ICD-10-CM

## 2023-09-19 DIAGNOSIS — N18.32 TYPE 2 DIABETES MELLITUS WITH STAGE 3B CHRONIC KIDNEY DISEASE, WITHOUT LONG-TERM CURRENT USE OF INSULIN (HCC): ICD-10-CM

## 2023-09-19 DIAGNOSIS — I10 ESSENTIAL HYPERTENSION: ICD-10-CM

## 2023-09-19 DIAGNOSIS — I71.20 THORACIC AORTIC ANEURYSM WITHOUT RUPTURE, UNSPECIFIED PART (HCC): ICD-10-CM

## 2023-09-19 DIAGNOSIS — E11.22 TYPE 2 DIABETES MELLITUS WITH STAGE 3B CHRONIC KIDNEY DISEASE, WITHOUT LONG-TERM CURRENT USE OF INSULIN (HCC): ICD-10-CM

## 2023-09-19 PROCEDURE — 99214 OFFICE O/P EST MOD 30 MIN: CPT | Performed by: FAMILY MEDICINE

## 2023-09-19 NOTE — ASSESSMENT & PLAN NOTE
Pending surgical repair by Dr. Carrie Pompa on 9/28/23  CXR: no acute changes. Unremarkable  EKG: NSR. No acute changes     Medications reviewed; patient was advised to hold aspirin 4 days prior to the surgery. Start medication within 24 hours postop or as directed by surgery team.  Patient is at intermediate cardiovascular risk for the mentioned procedure. No further recommendations from PCP standpoint at this time.    We will continue to follow up accordingly   Repeat CBC/CMP post op

## 2023-09-19 NOTE — PROGRESS NOTES
Assessment/Plan:    Type 2 diabetes mellitus with stage 3b chronic kidney disease, without long-term current use of insulin (HCC)    Lab Results   Component Value Date    HGBA1C 6.2 (H) 08/23/2023   at goal  No change in medications  Follow up accordingly    Essential hypertension  Vitally stable     Non-recurrent bilateral inguinal hernia without obstruction or gangrene  Pending surgical repair by Dr. Brian Noel on 9/28/23  CXR: no acute changes. Unremarkable  EKG: NSR. No acute changes     Medications reviewed; patient was advised to hold aspirin 4 days prior to the surgery. Start medication within 24 hours postop or as directed by surgery team.  Patient is at intermediate cardiovascular risk for the mentioned procedure. No further recommendations from PCP standpoint at this time. We will continue to follow up accordingly   Repeat CBC/CMP post op     Thoracic aortic aneurysm without rupture [facility])  He has a past surgical history that includes Thoracic aortic aneurysm repair and Other surgical history (2005). -recommendations to HOLD ASA 4 days prior to procedure or as directed by general surgery team       Diagnoses and all orders for this visit:    Non-recurrent bilateral inguinal hernia without obstruction or gangrene    Type 2 diabetes mellitus with stage 3b chronic kidney disease, without long-term current use of insulin (HCC)  -     CBC and differential; Future  -     Comprehensive metabolic panel; Future    Essential hypertension  -     CBC and differential; Future  -     Comprehensive metabolic panel; Future    Thoracic aortic aneurysm without rupture, unspecified part ([address])          PHQ-2/9 Depression Screening    Little interest or pleasure in doing things: 0 - not at all  Feeling down, depressed, or hopeless: 0 - not at all  PHQ-2 Score: 0  PHQ-2 Interpretation: Negative depression screen            Subjective:      Patient ID: Harvinder Camilo is a 82 y.o. male.     HPI    The following portions of the patient's history were reviewed and updated as appropriate: allergies, past surgical history and problem list.    Review of Systems   Constitutional: Negative. Negative for chills, fatigue and fever. HENT: Negative. Negative for hearing loss. Eyes: Negative. Negative for visual disturbance. Respiratory: Negative for shortness of breath and wheezing. Cardiovascular: Negative for chest pain and palpitations. Gastrointestinal: Negative for abdominal pain, blood in stool, constipation, diarrhea, nausea and vomiting. Endocrine: Negative. Genitourinary: Negative for difficulty urinating and dysuria. Musculoskeletal: Negative for arthralgias and myalgias. Skin: Negative. Allergic/Immunologic: Negative. Neurological: Negative for seizures, syncope and headaches. Hematological: Negative for adenopathy. Psychiatric/Behavioral: Negative. Objective:    /64 (BP Location: Left arm, Patient Position: Sitting)   Pulse 72   Temp 98 °F (36.7 °C) (Tympanic)   Ht 5' 5" (1.651 m)   Wt 65.8 kg (145 lb)   SpO2 97%   BMI 24.13 kg/m²      Physical Exam  Vitals and nursing note reviewed. Constitutional:       General: He is not in acute distress. Appearance: Normal appearance. He is not ill-appearing or diaphoretic. HENT:      Head: Normocephalic and atraumatic. Eyes:      Conjunctiva/sclera: Conjunctivae normal.   Cardiovascular:      Rate and Rhythm: Normal rate and regular rhythm. Heart sounds: Normal heart sounds. No murmur heard. Pulmonary:      Effort: Pulmonary effort is normal. No respiratory distress. Breath sounds: Normal breath sounds. No wheezing, rhonchi or rales. Abdominal:      General: There is no distension. Palpations: Abdomen is soft. There is no mass. Tenderness: There is no abdominal tenderness. There is no guarding or rebound. Hernia: A hernia (reducible) is present.  Hernia is present in the left inguinal area and right inguinal area.   Musculoskeletal:      Cervical back: Normal range of motion and neck supple. No rigidity. Right lower leg: No edema. Left lower leg: No edema. Lymphadenopathy:      Cervical: No cervical adenopathy. Neurological:      Mental Status: He is alert and oriented to person, place, and time. Psychiatric:         Mood and Affect: Mood normal.         Behavior: Behavior normal.         Thought Content:  Thought content normal.         Judgment: Judgment normal.

## 2023-09-19 NOTE — ASSESSMENT & PLAN NOTE
He has a past surgical history that includes Thoracic aortic aneurysm repair and Other surgical history (2005).     -recommendations to HOLD ASA 4 days prior to procedure or as directed by general surgery team

## 2023-09-19 NOTE — PRE-PROCEDURE INSTRUCTIONS
Pre-Surgery Instructions:   Medication Instructions   • amLODIPine (NORVASC) 10 mg tablet Take day of surgery. • aspirin (ECOTRIN LOW STRENGTH) 81 mg EC tablet will get instructions from PCP at [facility] appt 9/19   • CINNAMON PO Stop taking 7 days prior to surgery. • doxazosin (CARDURA) 2 mg tablet Take night before surgery   • lisinopril (ZESTRIL) 20 mg tablet Hold day of surgery. • metFORMIN (GLUCOPHAGE) 500 mg tablet Hold day of surgery. • simvastatin (ZOCOR) 20 mg tablet Take night before surgery    Medication instructions for day surgery reviewed. Please use only a sip of water to take your instructed medications. Avoid all over the counter vitamins, supplements and NSAIDS for one week prior to surgery per anesthesia guidelines. Tylenol is ok to take as needed. Will get advice from PCP at [facility] appt regarding aspirin hold instructions & call to inform us of dr advice. You will receive a call one business day prior to surgery with an arrival time and hospital directions. If your surgery is scheduled on a Monday, the hospital will be calling you on the Friday prior to your surgery. If you have not heard from anyone by 8pm, please call the hospital supervisor through the hospital  at [phone]. Do not eat or drink anything after midnight the night before your surgery, including candy, mints, lifesavers, or chewing gum. Do not drink alcohol 24hrs before your surgery. Try not to smoke at least 24hrs before your surgery. Follow the pre surgery showering instructions as listed in the [facility] Surgical Experience Booklet” or otherwise provided by your surgeon's office. Do not shave the surgical area 24 hours before surgery. Do not apply any lotions, creams, including makeup, cologne, deodorant, or perfumes after showering on the day of your surgery. No contact lenses, eye make-up, or artificial eyelashes.  Remove nail polish, including gel polish, and any artificial, gel, or acrylic nails if possible. Remove all jewelry including rings and body piercing jewelry. Wear causal clothing that is easy to take on and off. Consider your type of surgery. Keep any valuables, jewelry, piercings at home. Please bring any specially ordered equipment (sling, braces) if indicated. Arrange for a responsible person to drive you to and from the hospital on the day of your surgery. Visitor Guidelines discussed. Call the surgeon's office with any new illnesses, exposures, or additional questions prior to surgery. Please reference your [facility] Surgical Experience Booklet” for additional information to prepare for your upcoming surgery.

## 2023-09-19 NOTE — ASSESSMENT & PLAN NOTE
Lab Results   Component Value Date    HGBA1C 6.2 (H) 08/23/2023   at goal  No change in medications  Follow up accordingly

## 2023-09-25 ENCOUNTER — OFFICE VISIT (OUTPATIENT)
Dept: FAMILY MEDICINE CLINIC | Facility: CLINIC | Age: 88
End: 2023-09-25
Payer: MEDICARE

## 2023-09-25 VITALS
BODY MASS INDEX: 24.19 KG/M2 | SYSTOLIC BLOOD PRESSURE: 138 MMHG | OXYGEN SATURATION: 96 % | HEART RATE: 80 BPM | HEIGHT: 65 IN | TEMPERATURE: 98.7 F | WEIGHT: 145.2 LBS | DIASTOLIC BLOOD PRESSURE: 68 MMHG

## 2023-09-25 DIAGNOSIS — J01.00 ACUTE NON-RECURRENT MAXILLARY SINUSITIS: Primary | ICD-10-CM

## 2023-09-25 PROCEDURE — 99213 OFFICE O/P EST LOW 20 MIN: CPT | Performed by: FAMILY MEDICINE

## 2023-09-25 RX ORDER — AZITHROMYCIN 250 MG/1
TABLET, FILM COATED ORAL
Qty: 6 TABLET | Refills: 0 | Status: SHIPPED | OUTPATIENT
Start: 2023-09-25 | End: 2023-09-30

## 2023-09-25 NOTE — PROGRESS NOTES
Assessment/Plan:    No problem-specific Assessment & Plan notes found for this encounter. Diagnoses and all orders for this visit:    Acute non-recurrent maxillary sinusitis  Comments:  Covid negative  Orders:  -     azithromycin (Zithromax) 250 mg tablet; Take 2 tablets (500 mg total) by mouth daily for 1 day, THEN 1 tablet (250 mg total) daily for 4 days. PHQ-2/9 Depression Screening    Little interest or pleasure in doing things: 0 - not at all  Feeling down, depressed, or hopeless: 0 - not at all  PHQ-2 Score: 0  PHQ-2 Interpretation: Negative depression screen            Subjective:      Patient ID: Rasta Sarmiento is a 82 y.o. male. Pt is having bilateral inguinal hernia repair this Thursday    Cough  This is a new problem. The current episode started in the past 7 days. The problem has been gradually improving. The cough is non-productive. Associated symptoms include nasal congestion and rhinorrhea. Pertinent negatives include no chills, ear pain, fever, headaches, myalgias, postnasal drip, sore throat, shortness of breath or wheezing. He has tried nothing for the symptoms. The following portions of the patient's history were reviewed and updated as appropriate: allergies, current medications, past family history, past medical history, past social history, past surgical history and problem list.    Review of Systems   Constitutional: Negative for chills and fever. HENT: Positive for congestion and rhinorrhea. Negative for ear pain, postnasal drip and sore throat. Respiratory: Positive for cough. Negative for shortness of breath and wheezing. Gastrointestinal: Negative for diarrhea, nausea and vomiting. Musculoskeletal: Negative for myalgias. Neurological: Negative for headaches.          Objective:    /68 (BP Location: Left arm, Patient Position: Sitting)   Pulse 80   Temp 98.7 °F (37.1 °C) (Tympanic)   Ht 5' 5" (1.651 m)   Wt 65.9 kg (145 lb 3.2 oz)   SpO2 96% BMI 24.16 kg/m²      Physical Exam  Vitals and nursing note reviewed. Constitutional:       General: He is not in acute distress. Appearance: Normal appearance. He is not ill-appearing, toxic-appearing or diaphoretic. HENT:      Head: Normocephalic and atraumatic. Right Ear: Tympanic membrane, ear canal and external ear normal. There is no impacted cerumen. Left Ear: Tympanic membrane, ear canal and external ear normal. There is no impacted cerumen. Nose: Congestion and rhinorrhea present. Mouth/Throat:      Mouth: Mucous membranes are moist.      Pharynx: No oropharyngeal exudate or posterior oropharyngeal erythema. Eyes:      General:         Right eye: No discharge. Left eye: No discharge. Conjunctiva/sclera: Conjunctivae normal.   Cardiovascular:      Rate and Rhythm: Normal rate and regular rhythm. Heart sounds: Normal heart sounds. No murmur heard. Pulmonary:      Effort: Pulmonary effort is normal. No respiratory distress. Breath sounds: No wheezing, rhonchi or rales. Musculoskeletal:      Cervical back: Neck supple. No rigidity. Lymphadenopathy:      Cervical: No cervical adenopathy. Neurological:      Mental Status: He is alert and oriented to person, place, and time. Psychiatric:         Mood and Affect: Mood normal.         Behavior: Behavior normal.         Thought Content:  Thought content normal.         Judgment: Judgment normal.

## 2023-09-27 ENCOUNTER — TELEPHONE (OUTPATIENT)
Dept: SURGERY | Facility: CLINIC | Age: 88
End: 2023-09-27

## 2023-09-27 NOTE — TELEPHONE ENCOUNTER
Spoke with the patient to see how he is feeling and he stated that he is on antibiotics and is feeling better. Stated that he will keep his surgery appointment for 9/28/2023.

## 2023-09-28 ENCOUNTER — ANESTHESIA (OUTPATIENT)
Dept: PERIOP | Facility: HOSPITAL | Age: 88
End: 2023-09-28

## 2023-09-28 ENCOUNTER — HOSPITAL ENCOUNTER (OUTPATIENT)
Facility: HOSPITAL | Age: 88
Setting detail: OUTPATIENT SURGERY
Discharge: HOME | End: 2023-09-28
Attending: SURGERY | Admitting: SURGERY
Payer: MEDICARE

## 2023-09-28 ENCOUNTER — ANESTHESIA EVENT (OUTPATIENT)
Dept: PERIOP | Facility: HOSPITAL | Age: 88
End: 2023-09-28

## 2023-09-28 VITALS
DIASTOLIC BLOOD PRESSURE: 75 MMHG | BODY MASS INDEX: 24.66 KG/M2 | RESPIRATION RATE: 16 BRPM | OXYGEN SATURATION: 95 % | HEART RATE: 81 BPM | WEIGHT: 148 LBS | HEIGHT: 65 IN | SYSTOLIC BLOOD PRESSURE: 168 MMHG | TEMPERATURE: 97.6 F

## 2023-09-28 LAB
ANION GAP SERPL CALCULATED.3IONS-SCNC: 10 MMOL/L
BUN SERPL-MCNC: 20 MG/DL (ref 5–25)
CALCIUM SERPL-MCNC: 9.2 MG/DL (ref 8.4–10.2)
CHLORIDE SERPL-SCNC: 107 MMOL/L (ref 96–108)
CO2 SERPL-SCNC: 20 MMOL/L (ref 21–32)
CREAT SERPL-MCNC: 1.41 MG/DL (ref 0.6–1.3)
ERYTHROCYTE [DISTWIDTH] IN BLOOD BY AUTOMATED COUNT: 15.3 % (ref 11.6–15.1)
GFR SERPL CREATININE-BSD FRML MDRD: 44 ML/MIN/1.73SQ M
GLUCOSE P FAST SERPL-MCNC: 131 MG/DL (ref 65–99)
GLUCOSE SERPL-MCNC: 113 MG/DL (ref 65–140)
GLUCOSE SERPL-MCNC: 131 MG/DL (ref 65–140)
HCT VFR BLD AUTO: 37.1 % (ref 36.5–49.3)
HGB BLD-MCNC: 11.5 G/DL (ref 12–17)
MCH RBC QN AUTO: 22.6 PG (ref 26.8–34.3)
MCHC RBC AUTO-ENTMCNC: 31 G/DL (ref 31.4–37.4)
MCV RBC AUTO: 73 FL (ref 82–98)
PLATELET # BLD AUTO: 315 THOUSANDS/UL (ref 149–390)
PMV BLD AUTO: 10.3 FL (ref 8.9–12.7)
POTASSIUM SERPL-SCNC: 4.3 MMOL/L (ref 3.5–5.3)
RBC # BLD AUTO: 5.08 MILLION/UL (ref 3.88–5.62)
SODIUM SERPL-SCNC: 137 MMOL/L (ref 135–147)
WBC # BLD AUTO: 8.53 THOUSAND/UL (ref 4.31–10.16)

## 2023-09-28 PROCEDURE — 82948 REAGENT STRIP/BLOOD GLUCOSE: CPT

## 2023-09-28 PROCEDURE — 80048 BASIC METABOLIC PNL TOTAL CA: CPT | Performed by: PHYSICIAN ASSISTANT

## 2023-09-28 PROCEDURE — 85027 COMPLETE CBC AUTOMATED: CPT | Performed by: PHYSICIAN ASSISTANT

## 2023-09-28 RX ORDER — CHLORHEXIDINE GLUCONATE 4 G/100ML
SOLUTION TOPICAL DAILY PRN
Status: DISCONTINUED | OUTPATIENT
Start: 2023-09-28 | End: 2023-09-29 | Stop reason: HOSPADM

## 2023-09-28 RX ORDER — IPRATROPIUM BROMIDE AND ALBUTEROL SULFATE 2.5; .5 MG/3ML; MG/3ML
3 SOLUTION RESPIRATORY (INHALATION) ONCE
Status: DISCONTINUED | OUTPATIENT
Start: 2023-09-28 | End: 2023-09-29 | Stop reason: HOSPADM

## 2023-09-28 RX ORDER — IPRATROPIUM BROMIDE AND ALBUTEROL SULFATE 2.5; .5 MG/3ML; MG/3ML
SOLUTION RESPIRATORY (INHALATION)
Status: COMPLETED
Start: 2023-09-28 | End: 2023-09-28

## 2023-09-28 RX ORDER — CEFAZOLIN SODIUM 1 G/50ML
1000 SOLUTION INTRAVENOUS ONCE
Status: DISCONTINUED | OUTPATIENT
Start: 2023-09-28 | End: 2023-09-29 | Stop reason: HOSPADM

## 2023-09-28 RX ORDER — SODIUM CHLORIDE, SODIUM LACTATE, POTASSIUM CHLORIDE, CALCIUM CHLORIDE 600; 310; 30; 20 MG/100ML; MG/100ML; MG/100ML; MG/100ML
100 INJECTION, SOLUTION INTRAVENOUS CONTINUOUS
Status: DISCONTINUED | OUTPATIENT
Start: 2023-09-28 | End: 2023-09-29 | Stop reason: HOSPADM

## 2023-09-28 RX ADMIN — SODIUM CHLORIDE, SODIUM LACTATE, POTASSIUM CHLORIDE, AND CALCIUM CHLORIDE 100 ML/HR: .6; .31; .03; .02 INJECTION, SOLUTION INTRAVENOUS at 11:14

## 2023-09-28 RX ADMIN — IPRATROPIUM BROMIDE AND ALBUTEROL SULFATE 3 ML: .5; 3 SOLUTION RESPIRATORY (INHALATION) at 11:24

## 2023-09-28 NOTE — INTERVAL H&P NOTE
H&P reviewed. After examining the patient I find no changes in the patients condition since the H&P had been written.     Vitals:    09/28/23 1102   BP: 168/75   Pulse: 81   Resp: 16   Temp: 97.6 °F (36.4 °C)   SpO2: 95%

## 2023-09-28 NOTE — QUICK NOTE
Patient not fully recovered from recent URI. Joint decision made between surgery/anesthesia to reschedule this elective surgery. Patient agreeable.

## 2023-09-28 NOTE — ANESTHESIA PREPROCEDURE EVALUATION
Procedure:  REPAIR HERNIA INGUINAL (BILATERAL, OPEN) (Bilateral: Groin)    Relevant Problems   CARDIO   (+) Essential hypertension   (+) Hypercholesterolemia   (+) Thoracic aortic aneurysm without rupture (HCC)      ENDO   (+) Secondary hyperparathyroidism (HCC)   (+) Type 2 diabetes mellitus with stage 3b chronic kidney disease, without long-term current use of insulin (HCC)      /RENAL   (+) Kidney cyst, acquired   (+) Stage 3a chronic kidney disease (HCC)      PULMONARY   (+) SOB (shortness of breath)                Decision to cancel case due to recent URI. Pt still with cough and on ABX, satting 95%. Given elective nature of case surgeon is agreeable to reschedule. Pt informed to reschedule > 2 weeks after resolution of symptoms.

## 2023-10-04 ENCOUNTER — APPOINTMENT (OUTPATIENT)
Dept: LAB | Facility: CLINIC | Age: 88
End: 2023-10-04
Payer: MEDICARE

## 2023-10-04 DIAGNOSIS — E55.9 VITAMIN D DEFICIENCY: ICD-10-CM

## 2023-10-04 DIAGNOSIS — Z12.5 SCREENING FOR PROSTATE CANCER: ICD-10-CM

## 2023-10-04 DIAGNOSIS — N18.31 STAGE 3A CHRONIC KIDNEY DISEASE (HCC): ICD-10-CM

## 2023-10-04 DIAGNOSIS — N25.81 SECONDARY HYPERPARATHYROIDISM (HCC): ICD-10-CM

## 2023-10-04 LAB
25(OH)D3 SERPL-MCNC: 17.7 NG/ML (ref 30–100)
ALBUMIN SERPL BCP-MCNC: 3.9 G/DL (ref 3.5–5)
ALP SERPL-CCNC: 44 U/L (ref 34–104)
ALT SERPL W P-5'-P-CCNC: 20 U/L (ref 7–52)
ANION GAP SERPL CALCULATED.3IONS-SCNC: 14 MMOL/L
AST SERPL W P-5'-P-CCNC: 22 U/L (ref 13–39)
BASOPHILS # BLD AUTO: 0.06 THOUSANDS/ÂΜL (ref 0–0.1)
BASOPHILS NFR BLD AUTO: 1 % (ref 0–1)
BILIRUB SERPL-MCNC: 0.74 MG/DL (ref 0.2–1)
BUN SERPL-MCNC: 22 MG/DL (ref 5–25)
CALCIUM SERPL-MCNC: 9.2 MG/DL (ref 8.4–10.2)
CHLORIDE SERPL-SCNC: 109 MMOL/L (ref 96–108)
CO2 SERPL-SCNC: 18 MMOL/L (ref 21–32)
CREAT SERPL-MCNC: 1.43 MG/DL (ref 0.6–1.3)
CREAT UR-MCNC: 494.2 MG/DL
EOSINOPHIL # BLD AUTO: 0.19 THOUSAND/ÂΜL (ref 0–0.61)
EOSINOPHIL NFR BLD AUTO: 2 % (ref 0–6)
ERYTHROCYTE [DISTWIDTH] IN BLOOD BY AUTOMATED COUNT: 15.1 % (ref 11.6–15.1)
GFR SERPL CREATININE-BSD FRML MDRD: 44 ML/MIN/1.73SQ M
GLUCOSE P FAST SERPL-MCNC: 113 MG/DL (ref 65–99)
HCT VFR BLD AUTO: 35.5 % (ref 36.5–49.3)
HGB BLD-MCNC: 11.2 G/DL (ref 12–17)
IMM GRANULOCYTES # BLD AUTO: 0.05 THOUSAND/UL (ref 0–0.2)
IMM GRANULOCYTES NFR BLD AUTO: 1 % (ref 0–2)
LYMPHOCYTES # BLD AUTO: 2.31 THOUSANDS/ÂΜL (ref 0.6–4.47)
LYMPHOCYTES NFR BLD AUTO: 27 % (ref 14–44)
MCH RBC QN AUTO: 22.5 PG (ref 26.8–34.3)
MCHC RBC AUTO-ENTMCNC: 31.5 G/DL (ref 31.4–37.4)
MCV RBC AUTO: 71 FL (ref 82–98)
MICROALBUMIN UR-MCNC: 233.7 MG/L
MICROALBUMIN/CREAT 24H UR: 47 MG/G CREATININE (ref 0–30)
MONOCYTES # BLD AUTO: 0.71 THOUSAND/ÂΜL (ref 0.17–1.22)
MONOCYTES NFR BLD AUTO: 8 % (ref 4–12)
NEUTROPHILS # BLD AUTO: 5.41 THOUSANDS/ÂΜL (ref 1.85–7.62)
NEUTS SEG NFR BLD AUTO: 61 % (ref 43–75)
NRBC BLD AUTO-RTO: 0 /100 WBCS
PHOSPHATE SERPL-MCNC: 3.4 MG/DL (ref 2.3–4.1)
PLATELET # BLD AUTO: 319 THOUSANDS/UL (ref 149–390)
PMV BLD AUTO: 10.2 FL (ref 8.9–12.7)
POTASSIUM SERPL-SCNC: 4.6 MMOL/L (ref 3.5–5.3)
PROT SERPL-MCNC: 6.5 G/DL (ref 6.4–8.4)
PSA SERPL-MCNC: 0.16 NG/ML (ref 0–4)
PTH-INTACT SERPL-MCNC: 61.3 PG/ML (ref 12–88)
RBC # BLD AUTO: 4.98 MILLION/UL (ref 3.88–5.62)
SODIUM SERPL-SCNC: 141 MMOL/L (ref 135–147)
WBC # BLD AUTO: 8.73 THOUSAND/UL (ref 4.31–10.16)

## 2023-10-04 PROCEDURE — G0103 PSA SCREENING: HCPCS

## 2023-10-04 PROCEDURE — 83970 ASSAY OF PARATHORMONE: CPT

## 2023-10-04 PROCEDURE — 84100 ASSAY OF PHOSPHORUS: CPT

## 2023-10-04 PROCEDURE — 82043 UR ALBUMIN QUANTITATIVE: CPT

## 2023-10-04 PROCEDURE — 82306 VITAMIN D 25 HYDROXY: CPT

## 2023-10-04 PROCEDURE — 85025 COMPLETE CBC W/AUTO DIFF WBC: CPT

## 2023-10-04 PROCEDURE — 80053 COMPREHEN METABOLIC PANEL: CPT

## 2023-10-04 PROCEDURE — 36415 COLL VENOUS BLD VENIPUNCTURE: CPT

## 2023-10-04 PROCEDURE — 82570 ASSAY OF URINE CREATININE: CPT

## 2023-10-05 ENCOUNTER — APPOINTMENT (OUTPATIENT)
Dept: LAB | Facility: CLINIC | Age: 88
End: 2023-10-05
Payer: MEDICARE

## 2023-10-05 LAB
BACTERIA UR QL AUTO: ABNORMAL /HPF
BILIRUB UR QL STRIP: NEGATIVE
CLARITY UR: CLEAR
COLOR UR: ABNORMAL
GLUCOSE UR STRIP-MCNC: NEGATIVE MG/DL
HGB UR QL STRIP.AUTO: NEGATIVE
KETONES UR STRIP-MCNC: NEGATIVE MG/DL
LEUKOCYTE ESTERASE UR QL STRIP: NEGATIVE
MUCOUS THREADS UR QL AUTO: ABNORMAL
NITRITE UR QL STRIP: NEGATIVE
NON-SQ EPI CELLS URNS QL MICRO: ABNORMAL /HPF
PH UR STRIP.AUTO: 5.5 [PH]
PROT UR STRIP-MCNC: ABNORMAL MG/DL
RBC #/AREA URNS AUTO: ABNORMAL /HPF
SP GR UR STRIP.AUTO: 1.02 (ref 1–1.03)
UROBILINOGEN UR STRIP-ACNC: <2 MG/DL
WBC #/AREA URNS AUTO: ABNORMAL /HPF

## 2023-10-05 PROCEDURE — 81001 URINALYSIS AUTO W/SCOPE: CPT

## 2023-10-06 DIAGNOSIS — E55.9 VITAMIN D DEFICIENCY: Primary | ICD-10-CM

## 2023-10-06 RX ORDER — ERGOCALCIFEROL 1.25 MG/1
50000 CAPSULE ORAL WEEKLY
Qty: 12 CAPSULE | Refills: 1 | Status: SHIPPED | OUTPATIENT
Start: 2023-10-06

## 2023-10-09 NOTE — PROGRESS NOTES
Assessment/Plan:    No problem-specific Assessment & Plan notes found for this encounter. Diagnoses and all orders for this visit:    Type 2 diabetes mellitus with stage 3b chronic kidney disease, without long-term current use of insulin ([facility])  Comments:  dec metformin 500 twice daily to 500 daily A1c 6.3,  Recheck in December A1c if still 6.3 discontinue metformin  Has healthy diet and exercises 4 times per week  Orders:  -     HEMOGLOBIN A1C W/ EAG ESTIMATION; Future    Vitamin D deficiency  Comments:  Has been taking vitamin D 50,000 units weekly prescribed by nephrology    Essential hypertension  Comments:  BP home log around 130s/70s  Continue Norvasc 10 mg lisinopril 20 mg  Denies lightheadedness dizziness          Decreased his metformin from 500 twice daily to 500 daily In August his hemoglobin A1c is 6.3 is overcorrected for his age. I will recheck his hemoglobin A1c in December. If it still less than 7 I will discontinue his metformin. Patient is following healthy diet unfortunately he is not going to gym secondary to hernia. After the surgery he will start to go to gym 3-4 times per week. Regarding hypertension he will continue to take Norvasc 10 mg and lisinopril milligrams daily. I will recheck his blood pressure in December if it still soft for his age I will discontinue Norvasc and keep ACE for his blood pressure in the setting of diabetes and chronic kidney disease. Follows up with eye doctor in [facility]. He will have umbilical hernia repair in October. Subjective:      Patient ID: Josseline Marie is a 82 y.o. male. HPI  Is here for hypertension and diabetes follow-up  Decreased his metformin from 500 twice daily to 500 daily In August his hemoglobin A1c is 6.3 is overcorrected for his age. I will recheck his hemoglobin A1c in December. If it still less than 7 I will discontinue his metformin.   Patient is following healthy diet unfortunately he is not going to gym secondary to hernia. After the surgery he will start to go to gym 3-4 times per week. Regarding hypertension he will continue to take Norvasc 10 mg and lisinopril milligrams daily. I will recheck his blood pressure in December if it still not at the goal I will discontinue Norvasc. Follows up with eye doctor in [facility].     Patient sees ophthalmology in [facility]. Does see podiatry. He does not have any numbness tingling or weakness in his lower extremities. Denies smoking, recreational drugs. Alcohol occasionally. He is following up with nephrology. The following portions of the patient's history were reviewed and updated as appropriate: allergies, current medications, past family history, past medical history, past social history, past surgical history and problem list.    Review of Systems   Constitutional: Negative for chills and fever. HENT: Negative for ear pain and sore throat. Eyes: Negative for pain and visual disturbance. Respiratory: Negative for cough and shortness of breath. Cardiovascular: Negative for chest pain and palpitations. Gastrointestinal: Negative for abdominal pain and vomiting. Genitourinary: Negative for dysuria and hematuria. Skin: Negative for color change and rash. Neurological: Negative for seizures and syncope. All other systems reviewed and are negative. Objective:      /64 (BP Location: Left arm, Patient Position: Sitting)   Pulse 80   Temp 97.7 °F (36.5 °C) (Tympanic)   Ht 5' 5" (1.651 m)   Wt 66.1 kg (145 lb 12.8 oz)   SpO2 97%   BMI 24.26 kg/m²          Physical Exam  Vitals reviewed. HENT:      Head: Normocephalic and atraumatic.       Right Ear: Tympanic membrane normal.      Left Ear: Tympanic membrane normal.      Nose: Nose normal.      Mouth/Throat:      Mouth: Mucous membranes are moist.   Eyes:      Conjunctiva/sclera: Conjunctivae normal.      Pupils: Pupils are equal, round, and reactive to light. Cardiovascular:      Rate and Rhythm: Normal rate and regular rhythm. Pulses: Normal pulses. Heart sounds: Normal heart sounds. Pulmonary:      Effort: Pulmonary effort is normal.      Breath sounds: Normal breath sounds. Musculoskeletal:      Right lower leg: No edema. Left lower leg: No edema. Skin:     General: Skin is warm. Capillary Refill: Capillary refill takes less than 2 seconds. Neurological:      General: No focal deficit present. Mental Status: He is alert and oriented to person, place, and time. Psychiatric:         Mood and Affect: Mood normal.         Thought Content:  Thought content normal.

## 2023-10-10 ENCOUNTER — OFFICE VISIT (OUTPATIENT)
Dept: FAMILY MEDICINE CLINIC | Facility: CLINIC | Age: 88
End: 2023-10-10
Payer: MEDICARE

## 2023-10-10 VITALS
BODY MASS INDEX: 24.29 KG/M2 | SYSTOLIC BLOOD PRESSURE: 137 MMHG | HEIGHT: 65 IN | DIASTOLIC BLOOD PRESSURE: 64 MMHG | WEIGHT: 145.8 LBS | HEART RATE: 80 BPM | TEMPERATURE: 97.7 F | OXYGEN SATURATION: 97 %

## 2023-10-10 DIAGNOSIS — E55.9 VITAMIN D DEFICIENCY: ICD-10-CM

## 2023-10-10 DIAGNOSIS — E11.22 TYPE 2 DIABETES MELLITUS WITH STAGE 3B CHRONIC KIDNEY DISEASE, WITHOUT LONG-TERM CURRENT USE OF INSULIN (HCC): Primary | ICD-10-CM

## 2023-10-10 DIAGNOSIS — N18.32 TYPE 2 DIABETES MELLITUS WITH STAGE 3B CHRONIC KIDNEY DISEASE, WITHOUT LONG-TERM CURRENT USE OF INSULIN (HCC): Primary | ICD-10-CM

## 2023-10-10 DIAGNOSIS — I10 ESSENTIAL HYPERTENSION: ICD-10-CM

## 2023-10-10 PROCEDURE — 99213 OFFICE O/P EST LOW 20 MIN: CPT | Performed by: FAMILY MEDICINE

## 2023-10-24 NOTE — PRE-PROCEDURE INSTRUCTIONS
Pre-Surgery Instructions:   Medication Instructions    amLODIPine (NORVASC) 10 mg tablet Take day of surgery. aspirin (ECOTRIN LOW STRENGTH) 81 mg EC tablet Instructions provided by MD Hold 4 days prior to surgery per [facility]    CINNAMON PO Stop taking 7 days prior to surgery. doxazosin (CARDURA) 2 mg tablet Take night before surgery    ergocalciferol (VITAMIN D2) 50,000 units Continue normal weekly schedule    lisinopril (ZESTRIL) 20 mg tablet Hold day of surgery. metFORMIN (GLUCOPHAGE) 500 mg tablet Hold day of surgery. simvastatin (ZOCOR) 20 mg tablet Take night before surgery   Medication instructions for day surgery reviewed. Please use only a sip of water to take your instructed medications. Avoid all over the counter vitamins, supplements and NSAIDS for one week prior to surgery per anesthesia guidelines. Tylenol is ok to take as needed. You will receive a call one business day prior to surgery with an arrival time and hospital directions. If your surgery is scheduled on a Monday, the hospital will be calling you on the Friday prior to your surgery. If you have not heard from anyone by 8pm, please call the hospital supervisor through the hospital  at [phone]. Jaclyn Hernandezks [phone]). Do not eat or drink anything after midnight the night before your surgery, including candy, mints, lifesavers, or chewing gum. Do not drink alcohol 24hrs before your surgery. Try not to smoke at least 24hrs before your surgery. Follow the pre surgery showering instructions as listed in the [facility] Surgical Experience Booklet” or otherwise provided by your surgeon's office. Do not shave the surgical area 24 hours before surgery. Do not apply any lotions, creams, including makeup, cologne, deodorant, or perfumes after showering on the day of your surgery. No contact lenses, eye make-up, or artificial eyelashes.  Remove nail polish, including gel polish, and any artificial, gel, or acrylic nails if possible. Remove all jewelry including rings and body piercing jewelry. Wear causal clothing that is easy to take on and off. Consider your type of surgery. Keep any valuables, jewelry, piercings at home. Please bring any specially ordered equipment (sling, braces) if indicated. Arrange for a responsible person to drive you to and from the hospital on the day of your surgery. Visitor Guidelines discussed. Call the surgeon's office with any new illnesses, exposures, or additional questions prior to surgery. Please reference your [facility] Surgical Experience Booklet” for additional information to prepare for your upcoming surgery.

## 2023-11-03 ENCOUNTER — ANESTHESIA EVENT (OUTPATIENT)
Dept: PERIOP | Facility: HOSPITAL | Age: 88
End: 2023-11-03
Payer: MEDICARE

## 2023-11-03 ENCOUNTER — HOSPITAL ENCOUNTER (OUTPATIENT)
Facility: HOSPITAL | Age: 88
Setting detail: OUTPATIENT SURGERY
Discharge: HOME | End: 2023-11-03
Attending: SURGERY | Admitting: SURGERY
Payer: MEDICARE

## 2023-11-03 ENCOUNTER — ANESTHESIA (OUTPATIENT)
Dept: PERIOP | Facility: HOSPITAL | Age: 88
End: 2023-11-03
Payer: MEDICARE

## 2023-11-03 VITALS
RESPIRATION RATE: 12 BRPM | WEIGHT: 145 LBS | HEIGHT: 65 IN | TEMPERATURE: 97.6 F | BODY MASS INDEX: 24.16 KG/M2 | OXYGEN SATURATION: 96 % | DIASTOLIC BLOOD PRESSURE: 72 MMHG | HEART RATE: 80 BPM | SYSTOLIC BLOOD PRESSURE: 154 MMHG

## 2023-11-03 DIAGNOSIS — L72.0 EPIDERMOID CYST OF SKIN: Primary | ICD-10-CM

## 2023-11-03 DIAGNOSIS — K40.90 RIGHT INGUINAL HERNIA: ICD-10-CM

## 2023-11-03 DIAGNOSIS — K40.30 INCARCERATED LEFT INGUINAL HERNIA: ICD-10-CM

## 2023-11-03 DIAGNOSIS — K40.20 NON-RECURRENT BILATERAL INGUINAL HERNIA WITHOUT OBSTRUCTION OR GANGRENE: ICD-10-CM

## 2023-11-03 LAB — GLUCOSE SERPL-MCNC: 128 MG/DL (ref 65–140)

## 2023-11-03 PROCEDURE — C1781 MESH (IMPLANTABLE): HCPCS | Performed by: SURGERY

## 2023-11-03 PROCEDURE — 88304 TISSUE EXAM BY PATHOLOGIST: CPT | Performed by: PATHOLOGY

## 2023-11-03 PROCEDURE — 49505 PRP I/HERN INIT REDUC >5 YR: CPT

## 2023-11-03 PROCEDURE — 11406 EXC TR-EXT B9+MARG >4.0 CM: CPT | Performed by: SURGERY

## 2023-11-03 PROCEDURE — 82948 REAGENT STRIP/BLOOD GLUCOSE: CPT

## 2023-11-03 PROCEDURE — 49505 PRP I/HERN INIT REDUC >5 YR: CPT | Performed by: SURGERY

## 2023-11-03 DEVICE — BARD MESH PERFIX PLUG, EXTRA LARGE
Type: IMPLANTABLE DEVICE | Site: INGUINAL | Status: FUNCTIONAL
Brand: BARD MESH PERFIX PLUG

## 2023-11-03 RX ORDER — LIDOCAINE HYDROCHLORIDE 10 MG/ML
INJECTION, SOLUTION EPIDURAL; INFILTRATION; INTRACAUDAL; PERINEURAL AS NEEDED
Status: DISCONTINUED | OUTPATIENT
Start: 2023-11-03 | End: 2023-11-03

## 2023-11-03 RX ORDER — PROPOFOL 10 MG/ML
INJECTION, EMULSION INTRAVENOUS AS NEEDED
Status: DISCONTINUED | OUTPATIENT
Start: 2023-11-03 | End: 2023-11-03

## 2023-11-03 RX ORDER — OXYCODONE HYDROCHLORIDE 5 MG/1
5 TABLET ORAL EVERY 4 HOURS PRN
Qty: 12 TABLET | Refills: 0 | Status: SHIPPED | OUTPATIENT
Start: 2023-11-03 | End: 2023-11-13

## 2023-11-03 RX ORDER — MAGNESIUM HYDROXIDE 1200 MG/15ML
LIQUID ORAL AS NEEDED
Status: DISCONTINUED | OUTPATIENT
Start: 2023-11-03 | End: 2023-11-03 | Stop reason: HOSPADM

## 2023-11-03 RX ORDER — EPHEDRINE SULFATE 50 MG/ML
INJECTION INTRAVENOUS AS NEEDED
Status: DISCONTINUED | OUTPATIENT
Start: 2023-11-03 | End: 2023-11-03

## 2023-11-03 RX ORDER — LIDOCAINE HYDROCHLORIDE 10 MG/ML
0.5 INJECTION, SOLUTION EPIDURAL; INFILTRATION; INTRACAUDAL; PERINEURAL ONCE AS NEEDED
Status: DISCONTINUED | OUTPATIENT
Start: 2023-11-03 | End: 2023-11-03 | Stop reason: HOSPADM

## 2023-11-03 RX ORDER — HYDROMORPHONE HCL/PF 1 MG/ML
0.5 SYRINGE (ML) INJECTION
Status: DISCONTINUED | OUTPATIENT
Start: 2023-11-03 | End: 2023-11-03 | Stop reason: HOSPADM

## 2023-11-03 RX ORDER — BUPIVACAINE HYDROCHLORIDE 5 MG/ML
INJECTION, SOLUTION EPIDURAL; INTRACAUDAL AS NEEDED
Status: DISCONTINUED | OUTPATIENT
Start: 2023-11-03 | End: 2023-11-03 | Stop reason: HOSPADM

## 2023-11-03 RX ORDER — LIDOCAINE HYDROCHLORIDE AND EPINEPHRINE 10; 10 MG/ML; UG/ML
INJECTION, SOLUTION INFILTRATION; PERINEURAL AS NEEDED
Status: DISCONTINUED | OUTPATIENT
Start: 2023-11-03 | End: 2023-11-03 | Stop reason: HOSPADM

## 2023-11-03 RX ORDER — FENTANYL CITRATE 50 UG/ML
INJECTION, SOLUTION INTRAMUSCULAR; INTRAVENOUS AS NEEDED
Status: DISCONTINUED | OUTPATIENT
Start: 2023-11-03 | End: 2023-11-03

## 2023-11-03 RX ORDER — CEFAZOLIN SODIUM 1 G/50ML
1000 SOLUTION INTRAVENOUS ONCE
Status: COMPLETED | OUTPATIENT
Start: 2023-11-03 | End: 2023-11-03

## 2023-11-03 RX ORDER — ACETAMINOPHEN 500 MG
1000 TABLET ORAL EVERY 6 HOURS PRN
Refills: 0
Start: 2023-11-03

## 2023-11-03 RX ORDER — CHLORHEXIDINE GLUCONATE 4 G/100ML
SOLUTION TOPICAL DAILY PRN
Status: DISCONTINUED | OUTPATIENT
Start: 2023-11-03 | End: 2023-11-03 | Stop reason: HOSPADM

## 2023-11-03 RX ORDER — OXYCODONE HYDROCHLORIDE 5 MG/1
5 TABLET ORAL EVERY 4 HOURS PRN
Status: DISCONTINUED | OUTPATIENT
Start: 2023-11-03 | End: 2023-11-03 | Stop reason: HOSPADM

## 2023-11-03 RX ORDER — OXYCODONE HYDROCHLORIDE 5 MG/1
10 TABLET ORAL EVERY 4 HOURS PRN
Status: DISCONTINUED | OUTPATIENT
Start: 2023-11-03 | End: 2023-11-03 | Stop reason: HOSPADM

## 2023-11-03 RX ORDER — SODIUM CHLORIDE, SODIUM LACTATE, POTASSIUM CHLORIDE, CALCIUM CHLORIDE 600; 310; 30; 20 MG/100ML; MG/100ML; MG/100ML; MG/100ML
50 INJECTION, SOLUTION INTRAVENOUS CONTINUOUS
Status: DISCONTINUED | OUTPATIENT
Start: 2023-11-03 | End: 2023-11-03 | Stop reason: HOSPADM

## 2023-11-03 RX ORDER — SODIUM CHLORIDE, SODIUM LACTATE, POTASSIUM CHLORIDE, CALCIUM CHLORIDE 600; 310; 30; 20 MG/100ML; MG/100ML; MG/100ML; MG/100ML
125 INJECTION, SOLUTION INTRAVENOUS CONTINUOUS
Status: DISCONTINUED | OUTPATIENT
Start: 2023-11-03 | End: 2023-11-03 | Stop reason: HOSPADM

## 2023-11-03 RX ORDER — ACETAMINOPHEN 325 MG/1
650 TABLET ORAL EVERY 6 HOURS PRN
Status: DISCONTINUED | OUTPATIENT
Start: 2023-11-03 | End: 2023-11-03 | Stop reason: HOSPADM

## 2023-11-03 RX ORDER — DEXAMETHASONE SODIUM PHOSPHATE 10 MG/ML
INJECTION, SOLUTION INTRAMUSCULAR; INTRAVENOUS AS NEEDED
Status: DISCONTINUED | OUTPATIENT
Start: 2023-11-03 | End: 2023-11-03

## 2023-11-03 RX ORDER — ONDANSETRON 2 MG/ML
4 INJECTION INTRAMUSCULAR; INTRAVENOUS ONCE AS NEEDED
Status: DISCONTINUED | OUTPATIENT
Start: 2023-11-03 | End: 2023-11-03 | Stop reason: HOSPADM

## 2023-11-03 RX ORDER — ONDANSETRON 2 MG/ML
INJECTION INTRAMUSCULAR; INTRAVENOUS AS NEEDED
Status: DISCONTINUED | OUTPATIENT
Start: 2023-11-03 | End: 2023-11-03

## 2023-11-03 RX ADMIN — DEXAMETHASONE SODIUM PHOSPHATE 10 MG: 10 INJECTION, SOLUTION INTRAMUSCULAR; INTRAVENOUS at 09:24

## 2023-11-03 RX ADMIN — FENTANYL CITRATE 50 MCG: 50 INJECTION, SOLUTION INTRAMUSCULAR; INTRAVENOUS at 09:47

## 2023-11-03 RX ADMIN — PROPOFOL 150 MG: 10 INJECTION, EMULSION INTRAVENOUS at 09:24

## 2023-11-03 RX ADMIN — CEFAZOLIN SODIUM 1000 MG: 1 SOLUTION INTRAVENOUS at 09:21

## 2023-11-03 RX ADMIN — LIDOCAINE HYDROCHLORIDE 50 MG: 10 INJECTION, SOLUTION EPIDURAL; INFILTRATION; INTRACAUDAL; PERINEURAL at 09:24

## 2023-11-03 RX ADMIN — FENTANYL CITRATE 25 MCG: 50 INJECTION, SOLUTION INTRAMUSCULAR; INTRAVENOUS at 09:30

## 2023-11-03 RX ADMIN — SODIUM CHLORIDE, SODIUM LACTATE, POTASSIUM CHLORIDE, AND CALCIUM CHLORIDE: .6; .31; .03; .02 INJECTION, SOLUTION INTRAVENOUS at 09:21

## 2023-11-03 RX ADMIN — EPHEDRINE SULFATE 5 MG: 50 INJECTION, SOLUTION INTRAVENOUS at 09:34

## 2023-11-03 RX ADMIN — ONDANSETRON 4 MG: 2 INJECTION INTRAMUSCULAR; INTRAVENOUS at 11:02

## 2023-11-03 RX ADMIN — FENTANYL CITRATE 25 MCG: 50 INJECTION, SOLUTION INTRAMUSCULAR; INTRAVENOUS at 10:26

## 2023-11-03 NOTE — ANESTHESIA PREPROCEDURE EVALUATION
Procedure:  REPAIR HERNIA INGUINAL (open) with mesh (Bilateral: Groin)    Relevant Problems   CARDIO   (+) Essential hypertension   (+) Hypercholesterolemia   (+) Thoracic aortic aneurysm without rupture (HCC)      ENDO   (+) Secondary hyperparathyroidism (HCC)   (+) Type 2 diabetes mellitus with stage 3b chronic kidney disease, without long-term current use of insulin (HCC)      /RENAL   (+) Stage 3a chronic kidney disease (HCC)      URI four weeks ago. Cough has since resolved. Confirmed NPO appropriate    Physical Exam    Airway    Mallampati score: II  TM Distance: >3 FB  Neck ROM: full     Dental    upper dentures and lower dentures    Cardiovascular      Pulmonary      Other Findings        Anesthesia Plan  ASA Score- 3     Anesthesia Type- general with ASA Monitors. Additional Monitors:     Airway Plan: LMA. Plan Factors-Exercise tolerance (METS): >4 METS. Exercise comment: Able to climb flight of stairs without cardiopulmonary limitation. Chart reviewed. Existing labs reviewed. Patient is not a current smoker. Patient did not smoke on day of surgery. Induction- intravenous. Postoperative Plan- Plan for postoperative opioid use. Planned trial extubation    Informed Consent- Anesthetic plan and risks discussed with patient.

## 2023-11-03 NOTE — OP NOTE
OPERATIVE REPORT  PATIENT NAME: Yumiko Bates    :  1935  MRN: [id number]  Pt Location: [facility] OR ROOM 01    SURGERY DATE: 11/3/2023    Surgeon(s) and Role:     * Juan Otero MD - Primary     * Chin Rondon PA-C - Assisting  The PA was necessary to provide expert assistance; i.e. in the form of providing optimal exposure with retraction, suturing, and assistance with dissection in order to perform the most efficient operation and in order to optimize patient safety in the abscence of a qualified surgical resident. Preop Diagnosis:  Incarcerated left inguinal hernia [K40.30]  Right inguinal hernia [K40.90]    Post-Op Diagnosis Codes:     * Bilateral inguinal hernia [K40.20]     * Epidermoid cyst of skin [L72.0]    Procedure(s):  Bilateral - REPAIR HERNIA INGUINAL (open) with mesh. excision cyst right groin    Specimen(s):  ID Type Source Tests Collected by Time Destination   1 : cyst right groin Tissue Cyst TISSUE Patience Currie MD 11/3/2023 7719        Estimated Blood Loss:   Minimal    Drains:  * No LDAs found *    Anesthesia Type:   General    Operative Indications:  Incarcerated left inguinal hernia [K40.30]  Right inguinal hernia [K40.90]  The patient is an 82-year-old male presenting with bilateral inguinal hernias and an epidermoid cyst of the right groin for definitive treatment. Operative Findings:  Bilateral direct inguinal hernias. Both sides repaired with extra-large Bard PerFix plug and patch. 3 cm x 1.5 cm epidermoid cyst sharply excised. Complications:   None    Procedure and Technique:  The patient was taken to the operating room where they are properly identified, monitored and anesthetized. The received antibiotics perioperatively. Venodynes for DVT prophylaxis. Time-out performed. Skin infiltrated with 1% lidocaine local anesthesia with epinephrine. Skin incised.   Dissection carried down through Karissa's to the aponeurosis of the external oblique which was opened to the external ring. Leaves developed superiorly and inferiorly. Self-retaining retractor placed. The spermatic cord dissected out at the pubic tubercle and controlled with a Penrose drain. The direct sac readily identified. The transversalis fascia incised and the hernia reduced. A mesh plug was placed through the floor of Hesselbach triangle and secured circumferentially with interrupted 2 0 Vicryl suture. The onlay mesh placed over the floor the canal and around the internal ring. The aponeurosis of the external oblique closed over top of the repair with a running 3 0 Vicryl suture. The wound irrigated and closed in layers with interrupted 3 0 Vicryl on Karissa's and running subcuticular 4 Monocryl suture on skin. The wounds infiltrated with 0.5% Marcaine. Dressed. Patient extubated and taken to recovery in stable condition. Procedure performed the exact same fashion on both sides. Finally the 3 x 1 cm area of disease skin in the right groin containing the epidermoid cyst was sharply excised and the wound closed primarily with deep sutures of 3-0 Vicryl and subcuticular 4-0 Monocryl. Wounds dressed. Patient tolerated all procedures well. I was present for the entire procedure.     Patient Disposition:  PACU         SIGNATURE: Liya Adan MD  DATE: November 3, 2023  TIME: 11:04 AM

## 2023-11-03 NOTE — ANESTHESIA POSTPROCEDURE EVALUATION
Post-Op Assessment Note    CV Status:  Stable  Pain Score: 0    Pain management: adequate     Mental Status:  Alert and awake   Hydration Status:  Euvolemic   PONV Controlled:  Controlled   Airway Patency:  Patent      Post Op Vitals Reviewed: Yes      Staff: Anesthesiologist, CRNA         No notable events documented.     BP   128/58   Temp   98   Pulse  78   Resp   12   SpO2   98 4L

## 2023-11-03 NOTE — INTERVAL H&P NOTE
H&P reviewed. After examining the patient I find no changes in the patients condition since the H&P had been written.     Vitals:    11/03/23 0837   BP: 169/77   Pulse: 79   Resp: 18   Temp: 97.6 °F (36.4 °C)   SpO2: 97%

## 2023-11-03 NOTE — DISCHARGE INSTR - AVS FIRST PAGE
DISCHARGE INSTRUCTIONS:   Light activity   No heavy lifting, limit lifting to 15-20 pounds for 2 weeks   No limitations for diet   Please take medications as prescribed   Please take acetaminophen/ibuprofen scheduled every 4-6 hours for pain  Please take narcotic pain medication as needed for severe pain   Do not drive if taking narcotic pain medication     If surgical glue is over your incisions, this will peel off on its own  Please do not remove glue   You may shower but do not scrub or submerge incisions    Please notify general surgery office if you experience:  Fever over 101.5F  Persistent nausea or vomiting  Severe uncontrolled pain  Redness, tenderness, or signs of infection near incisions (pain, swelling, redness, yellow/green drainage)  Active or persistent bleeding from incisions  Chest pain, shortness of breath     You have a follow up appointment with the general surgery office on 11/17/2023, please keep this appointment  Please call our office with any additional questions or concerns

## 2023-11-07 PROCEDURE — 88304 TISSUE EXAM BY PATHOLOGIST: CPT | Performed by: PATHOLOGY

## 2023-11-08 ENCOUNTER — TELEPHONE (OUTPATIENT)
Dept: SURGERY | Facility: CLINIC | Age: 88
End: 2023-11-08

## 2023-11-08 NOTE — TELEPHONE ENCOUNTER
Patient's wife contacted by phone. Results of pathology reviewed with the patient and all questions answered to their satisfaction.

## 2023-11-09 DIAGNOSIS — E78.5 HYPERLIPIDEMIA, UNSPECIFIED HYPERLIPIDEMIA TYPE: ICD-10-CM

## 2023-11-09 DIAGNOSIS — I10 HYPERTENSION, UNSPECIFIED TYPE: ICD-10-CM

## 2023-11-09 DIAGNOSIS — N18.32 TYPE 2 DIABETES MELLITUS WITH STAGE 3B CHRONIC KIDNEY DISEASE, WITHOUT LONG-TERM CURRENT USE OF INSULIN (HCC): ICD-10-CM

## 2023-11-09 DIAGNOSIS — E11.22 TYPE 2 DIABETES MELLITUS WITH STAGE 3B CHRONIC KIDNEY DISEASE, WITHOUT LONG-TERM CURRENT USE OF INSULIN (HCC): ICD-10-CM

## 2023-11-09 DIAGNOSIS — I10 ESSENTIAL HYPERTENSION: ICD-10-CM

## 2023-11-09 DIAGNOSIS — N18.32 CHRONIC KIDNEY DISEASE (CKD) STAGE G3B/A1, MODERATELY DECREASED GLOMERULAR FILTRATION RATE (GFR) BETWEEN 30-44 ML/MIN/1.73 SQUARE METER AND ALBUMINURIA CREATININE RATIO LESS THAN 30 MG/G (HCC): ICD-10-CM

## 2023-11-09 NOTE — TELEPHONE ENCOUNTER
Patient is requesting you to change these to 180 day supplies so it is cheaper for him? They are only $10 when you call it in as a 180 day supply.

## 2023-11-13 DIAGNOSIS — E78.5 HYPERLIPIDEMIA, UNSPECIFIED HYPERLIPIDEMIA TYPE: ICD-10-CM

## 2023-11-13 RX ORDER — SIMVASTATIN 20 MG
20 TABLET ORAL
Qty: 30 TABLET | Refills: 1 | Status: SHIPPED | OUTPATIENT
Start: 2023-11-13 | End: 2023-11-13 | Stop reason: SDUPTHER

## 2023-11-13 RX ORDER — SIMVASTATIN 20 MG
20 TABLET ORAL
Qty: 90 TABLET | Refills: 1 | Status: SHIPPED | OUTPATIENT
Start: 2023-11-13

## 2023-11-14 RX ORDER — LISINOPRIL 20 MG/1
20 TABLET ORAL DAILY
Qty: 90 TABLET | Refills: 2 | Status: SHIPPED | OUTPATIENT
Start: 2023-11-14

## 2023-11-14 RX ORDER — SIMVASTATIN 20 MG
20 TABLET ORAL
Qty: 30 TABLET | Refills: 1 | Status: SHIPPED | OUTPATIENT
Start: 2023-11-14

## 2023-11-14 RX ORDER — AMLODIPINE BESYLATE 10 MG/1
10 TABLET ORAL DAILY
Qty: 90 TABLET | Refills: 1 | Status: SHIPPED | OUTPATIENT
Start: 2023-11-14

## 2023-11-15 ENCOUNTER — TELEPHONE (OUTPATIENT)
Dept: NEPHROLOGY | Facility: CLINIC | Age: 88
End: 2023-11-15

## 2023-11-15 DIAGNOSIS — E55.9 VITAMIN D DEFICIENCY: ICD-10-CM

## 2023-11-15 DIAGNOSIS — N18.31 STAGE 3A CHRONIC KIDNEY DISEASE (HCC): Primary | ICD-10-CM

## 2023-11-16 NOTE — PROGRESS NOTES
Post-Op Note - General Surgery   Bruce Valle 82 y.o. male MRN: [id number]  Encounter: [id number]    Assessment/Plan    Epidermoid cyst of skin  Doing well after excision of epidermoid cyst from the right groin at the time of his hernia repairs. Incision clean dry and intact without signs of infection. Copy of the op and path provided and reviewed, we will see back as needed. Incarcerated left inguinal hernia  Doing well after open bilateral inguinal hernia repair with plug and patch technique using mesh. Incisions well-healed. Bilateral healing ridge is appreciated without significant seroma, no signs of infection or early recurrence. Advised 20 pound lifting restrictions for 2 weeks then 40 pounds for the following 4 weeks and advised to avoid activities or lifting in the gym that causes pain. Questions answered copy of the op note provided and reviewed, we will see back as needed. Diagnoses and all orders for this visit:    Epidermoid cyst of skin    Incarcerated left inguinal hernia        Subjective    No chief complaint on file. Patient is coming in the office for s/p bi-lat ing hernia rpr 11/03/2023. Review of Systems   All other systems reviewed and are negative. The following portions of the patient's history were reviewed and updated as appropriate: allergies, current medications, past family history, past medical history, past social history, past surgical history, and problem list.    Objective      There were no vitals taken for this visit. Physical Exam  Vitals and nursing note reviewed. Constitutional:       General: He is not in acute distress. Appearance: He is well-developed. He is not diaphoretic. HENT:      Head: Normocephalic and atraumatic. Eyes:      Conjunctiva/sclera: Conjunctivae normal.      Pupils: Pupils are equal, round, and reactive to light. Pulmonary:      Effort: No respiratory distress.    Genitourinary:     Comments: Bilateral groin incisions clean dry and intact without signs of infection. Healing ridge is noted to each, right side greater than left side. No significant seroma or signs of early recurrence. Groin incision from epidermoid cyst excision is well-healed. Musculoskeletal:         General: Normal range of motion. Cervical back: Normal range of motion. Skin:     General: Skin is warm and dry. Capillary Refill: Capillary refill takes less than 2 seconds. Neurological:      Mental Status: He is alert and oriented to person, place, and time.    Psychiatric:         Behavior: Behavior normal.         Signature:  Andrew Rojo PA-C  Date: 11/17/2023 Time: 10:38 AM

## 2023-11-17 ENCOUNTER — OFFICE VISIT (OUTPATIENT)
Dept: SURGERY | Facility: CLINIC | Age: 88
End: 2023-11-17

## 2023-11-17 DIAGNOSIS — K40.30 INCARCERATED LEFT INGUINAL HERNIA: ICD-10-CM

## 2023-11-17 DIAGNOSIS — L72.0 EPIDERMOID CYST OF SKIN: Primary | ICD-10-CM

## 2023-11-17 PROBLEM — K40.20 NON-RECURRENT BILATERAL INGUINAL HERNIA WITHOUT OBSTRUCTION OR GANGRENE: Status: RESOLVED | Noted: 2023-09-11 | Resolved: 2023-11-17

## 2023-11-17 PROCEDURE — 99024 POSTOP FOLLOW-UP VISIT: CPT | Performed by: PHYSICIAN ASSISTANT

## 2023-11-17 NOTE — ASSESSMENT & PLAN NOTE
Doing well after open bilateral inguinal hernia repair with plug and patch technique using mesh. Incisions well-healed. Bilateral healing ridge is appreciated without significant seroma, no signs of infection or early recurrence. Advised 20 pound lifting restrictions for 2 weeks then 40 pounds for the following 4 weeks and advised to avoid activities or lifting in the gym that causes pain. Questions answered copy of the op note provided and reviewed, we will see back as needed.

## 2023-11-17 NOTE — ASSESSMENT & PLAN NOTE
Doing well after excision of epidermoid cyst from the right groin at the time of his hernia repairs. Incision clean dry and intact without signs of infection. Copy of the op and path provided and reviewed, we will see back as needed.

## 2023-11-24 DIAGNOSIS — E11.22 TYPE 2 DIABETES MELLITUS WITH STAGE 3B CHRONIC KIDNEY DISEASE, WITHOUT LONG-TERM CURRENT USE OF INSULIN (HCC): ICD-10-CM

## 2023-11-24 DIAGNOSIS — N18.32 TYPE 2 DIABETES MELLITUS WITH STAGE 3B CHRONIC KIDNEY DISEASE, WITHOUT LONG-TERM CURRENT USE OF INSULIN (HCC): ICD-10-CM

## 2023-12-06 ENCOUNTER — APPOINTMENT (OUTPATIENT)
Dept: LAB | Facility: CLINIC | Age: 88
End: 2023-12-06
Payer: MEDICARE

## 2023-12-06 DIAGNOSIS — E11.22 TYPE 2 DIABETES MELLITUS WITH STAGE 3B CHRONIC KIDNEY DISEASE, WITHOUT LONG-TERM CURRENT USE OF INSULIN (HCC): ICD-10-CM

## 2023-12-06 DIAGNOSIS — N18.32 TYPE 2 DIABETES MELLITUS WITH STAGE 3B CHRONIC KIDNEY DISEASE, WITHOUT LONG-TERM CURRENT USE OF INSULIN (HCC): ICD-10-CM

## 2023-12-06 LAB
EST. AVERAGE GLUCOSE BLD GHB EST-MCNC: 134 MG/DL
HBA1C MFR BLD: 6.3 %

## 2023-12-06 PROCEDURE — 83036 HEMOGLOBIN GLYCOSYLATED A1C: CPT

## 2023-12-06 PROCEDURE — 36415 COLL VENOUS BLD VENIPUNCTURE: CPT

## 2023-12-11 NOTE — PROGRESS NOTES
Assessment/Plan:    No problem-specific Assessment & Plan notes found for this encounter. Diagnoses and all orders for this visit:    Essential hypertension  Comments:  home BP around  120s over 60s. Will decrease Norvasc to 5 mg daily from 10. cont ACE 10 mg  6 weeks follow-up with the nurse for BP    Type 2 diabetes mellitus with stage 3b chronic kidney disease, without long-term current use of insulin ([facility])  Comments:  Recheck A1c in 7 months if it still less than 7 discontinue metformin. Patient follows healthy diet and exercise. Orders:  -     HEMOGLOBIN A1C W/ EAG ESTIMATION; Future    Stage 3a chronic kidney disease ([address])  Comments:  Plan per nephrology        No smoking no recreational drugs alcohol occasionally. Subjective:      Patient ID: Liborio Stubbs is a 82 y.o. male. HPI  Patient is here for blood pressure and diabetes follow-up. I decreased his metformin from 1000 daily to 500mg daily his A1c is still well-controlled for his age. Patient is following up healthy diet and exercises 3 times per week. If his A1c in 6 months again well-controlled I will discontinue his metformin. Regarding hypertension he has been taking Norvasc 10 mg and lisinopril 20 mg. His blood pressure is over controlled for his age around 80s over 76s. Decrease Norvasc from 10 mg to 5 mg. Follows up with eye doctor in [facility]. The following portions of the patient's history were reviewed and updated as appropriate: allergies, current medications, past family history, past medical history, past social history, past surgical history, and problem list.    Review of Systems   Constitutional:  Negative for chills and fever. HENT:  Negative for ear pain and sore throat. Eyes:  Negative for pain and visual disturbance. Respiratory:  Negative for cough and shortness of breath. Cardiovascular:  Negative for chest pain and palpitations.    Gastrointestinal:  Negative for abdominal pain and vomiting. Genitourinary:  Negative for dysuria and hematuria. Musculoskeletal:  Negative for arthralgias and back pain. Skin:  Negative for color change and rash. Neurological:  Negative for seizures and syncope. All other systems reviewed and are negative. Objective:      /60   Pulse 66   Temp (!) 97.4 °F (36.3 °C)   Ht 5' 5" (1.651 m)   Wt 66.7 kg (147 lb)   SpO2 98%   BMI 24.46 kg/m²          Physical Exam  Vitals reviewed. Constitutional:       Appearance: He is normal weight. HENT:      Head: Normocephalic and atraumatic. Nose: Nose normal.      Mouth/Throat:      Mouth: Mucous membranes are moist.   Eyes:      Conjunctiva/sclera: Conjunctivae normal.      Pupils: Pupils are equal, round, and reactive to light. Cardiovascular:      Rate and Rhythm: Normal rate and regular rhythm. Pulses: Normal pulses. Heart sounds: Normal heart sounds. Pulmonary:      Effort: Pulmonary effort is normal.      Breath sounds: Normal breath sounds. Musculoskeletal:      Right lower leg: No edema. Left lower leg: No edema. Skin:     General: Skin is warm. Capillary Refill: Capillary refill takes less than 2 seconds. Neurological:      General: No focal deficit present. Mental Status: He is alert and oriented to person, place, and time. Psychiatric:         Mood and Affect: Mood normal.         Thought Content:  Thought content normal.

## 2023-12-12 ENCOUNTER — OFFICE VISIT (OUTPATIENT)
Dept: FAMILY MEDICINE CLINIC | Facility: CLINIC | Age: 88
End: 2023-12-12
Payer: MEDICARE

## 2023-12-12 VITALS
SYSTOLIC BLOOD PRESSURE: 124 MMHG | BODY MASS INDEX: 24.49 KG/M2 | DIASTOLIC BLOOD PRESSURE: 60 MMHG | TEMPERATURE: 97.4 F | HEIGHT: 65 IN | HEART RATE: 66 BPM | WEIGHT: 147 LBS | OXYGEN SATURATION: 98 %

## 2023-12-12 DIAGNOSIS — E11.22 TYPE 2 DIABETES MELLITUS WITH STAGE 3B CHRONIC KIDNEY DISEASE, WITHOUT LONG-TERM CURRENT USE OF INSULIN (HCC): ICD-10-CM

## 2023-12-12 DIAGNOSIS — N18.32 TYPE 2 DIABETES MELLITUS WITH STAGE 3B CHRONIC KIDNEY DISEASE, WITHOUT LONG-TERM CURRENT USE OF INSULIN (HCC): ICD-10-CM

## 2023-12-12 DIAGNOSIS — I10 ESSENTIAL HYPERTENSION: Primary | ICD-10-CM

## 2023-12-12 DIAGNOSIS — N18.31 STAGE 3A CHRONIC KIDNEY DISEASE (HCC): ICD-10-CM

## 2023-12-12 PROCEDURE — 99213 OFFICE O/P EST LOW 20 MIN: CPT

## 2024-04-03 ENCOUNTER — APPOINTMENT (OUTPATIENT)
Dept: LAB | Facility: CLINIC | Age: 89
End: 2024-04-03
Payer: MEDICARE

## 2024-04-03 DIAGNOSIS — E55.9 VITAMIN D DEFICIENCY: ICD-10-CM

## 2024-04-03 DIAGNOSIS — N18.31 STAGE 3A CHRONIC KIDNEY DISEASE (HCC): ICD-10-CM

## 2024-04-03 LAB
25(OH)D3 SERPL-MCNC: 54.5 NG/ML (ref 30–100)
ALBUMIN SERPL BCP-MCNC: 4 G/DL (ref 3.5–5)
ALP SERPL-CCNC: 46 U/L (ref 34–104)
ALT SERPL W P-5'-P-CCNC: 15 U/L (ref 7–52)
ANION GAP SERPL CALCULATED.3IONS-SCNC: 12 MMOL/L (ref 4–13)
AST SERPL W P-5'-P-CCNC: 19 U/L (ref 13–39)
BACTERIA UR QL AUTO: ABNORMAL /HPF
BASOPHILS # BLD AUTO: 0.06 THOUSANDS/ÂΜL (ref 0–0.1)
BASOPHILS NFR BLD AUTO: 1 % (ref 0–1)
BILIRUB SERPL-MCNC: 0.86 MG/DL (ref 0.2–1)
BILIRUB UR QL STRIP: NEGATIVE
BUN SERPL-MCNC: 19 MG/DL (ref 5–25)
CALCIUM SERPL-MCNC: 8.9 MG/DL (ref 8.4–10.2)
CHLORIDE SERPL-SCNC: 106 MMOL/L (ref 96–108)
CLARITY UR: CLEAR
CO2 SERPL-SCNC: 21 MMOL/L (ref 21–32)
COLOR UR: YELLOW
CREAT SERPL-MCNC: 1.2 MG/DL (ref 0.6–1.3)
CREAT UR-MCNC: 269.9 MG/DL
EOSINOPHIL # BLD AUTO: 0.19 THOUSAND/ÂΜL (ref 0–0.61)
EOSINOPHIL NFR BLD AUTO: 2 % (ref 0–6)
ERYTHROCYTE [DISTWIDTH] IN BLOOD BY AUTOMATED COUNT: 15.9 % (ref 11.6–15.1)
GFR SERPL CREATININE-BSD FRML MDRD: 54 ML/MIN/1.73SQ M
GLUCOSE P FAST SERPL-MCNC: 118 MG/DL (ref 65–99)
GLUCOSE UR STRIP-MCNC: NEGATIVE MG/DL
HCT VFR BLD AUTO: 38.1 % (ref 36.5–49.3)
HGB BLD-MCNC: 11.7 G/DL (ref 12–17)
HGB UR QL STRIP.AUTO: NEGATIVE
HYALINE CASTS #/AREA URNS LPF: ABNORMAL /LPF
IMM GRANULOCYTES # BLD AUTO: 0.06 THOUSAND/UL (ref 0–0.2)
IMM GRANULOCYTES NFR BLD AUTO: 1 % (ref 0–2)
KETONES UR STRIP-MCNC: NEGATIVE MG/DL
LEUKOCYTE ESTERASE UR QL STRIP: NEGATIVE
LYMPHOCYTES # BLD AUTO: 2.18 THOUSANDS/ÂΜL (ref 0.6–4.47)
LYMPHOCYTES NFR BLD AUTO: 20 % (ref 14–44)
MAGNESIUM SERPL-MCNC: 1.5 MG/DL (ref 1.9–2.7)
MCH RBC QN AUTO: 22.3 PG (ref 26.8–34.3)
MCHC RBC AUTO-ENTMCNC: 30.7 G/DL (ref 31.4–37.4)
MCV RBC AUTO: 73 FL (ref 82–98)
MICROALBUMIN UR-MCNC: 537.4 MG/L
MICROALBUMIN/CREAT 24H UR: 199 MG/G CREATININE (ref 0–30)
MONOCYTES # BLD AUTO: 0.89 THOUSAND/ÂΜL (ref 0.17–1.22)
MONOCYTES NFR BLD AUTO: 8 % (ref 4–12)
MUCOUS THREADS UR QL AUTO: ABNORMAL
NEUTROPHILS # BLD AUTO: 7.39 THOUSANDS/ÂΜL (ref 1.85–7.62)
NEUTS SEG NFR BLD AUTO: 68 % (ref 43–75)
NITRITE UR QL STRIP: NEGATIVE
NON-SQ EPI CELLS URNS QL MICRO: ABNORMAL /HPF
NRBC BLD AUTO-RTO: 0 /100 WBCS
PH UR STRIP.AUTO: 5.5 [PH]
PHOSPHATE SERPL-MCNC: 3.6 MG/DL (ref 2.3–4.1)
PLATELET # BLD AUTO: 282 THOUSANDS/UL (ref 149–390)
PMV BLD AUTO: 10.4 FL (ref 8.9–12.7)
POTASSIUM SERPL-SCNC: 4.1 MMOL/L (ref 3.5–5.3)
PROT SERPL-MCNC: 6.5 G/DL (ref 6.4–8.4)
PROT UR STRIP-MCNC: ABNORMAL MG/DL
PTH-INTACT SERPL-MCNC: 71 PG/ML (ref 12–88)
RBC # BLD AUTO: 5.25 MILLION/UL (ref 3.88–5.62)
RBC #/AREA URNS AUTO: ABNORMAL /HPF
SODIUM SERPL-SCNC: 139 MMOL/L (ref 135–147)
SP GR UR STRIP.AUTO: 1.02 (ref 1–1.03)
UROBILINOGEN UR STRIP-ACNC: <2 MG/DL
WBC # BLD AUTO: 10.77 THOUSAND/UL (ref 4.31–10.16)
WBC #/AREA URNS AUTO: ABNORMAL /HPF

## 2024-04-03 PROCEDURE — 80053 COMPREHEN METABOLIC PANEL: CPT

## 2024-04-03 PROCEDURE — 82306 VITAMIN D 25 HYDROXY: CPT

## 2024-04-03 PROCEDURE — 83970 ASSAY OF PARATHORMONE: CPT

## 2024-04-03 PROCEDURE — 82570 ASSAY OF URINE CREATININE: CPT

## 2024-04-03 PROCEDURE — 83735 ASSAY OF MAGNESIUM: CPT

## 2024-04-03 PROCEDURE — 82043 UR ALBUMIN QUANTITATIVE: CPT

## 2024-04-03 PROCEDURE — 85025 COMPLETE CBC W/AUTO DIFF WBC: CPT

## 2024-04-03 PROCEDURE — 84100 ASSAY OF PHOSPHORUS: CPT

## 2024-04-03 PROCEDURE — 36415 COLL VENOUS BLD VENIPUNCTURE: CPT

## 2024-04-03 PROCEDURE — 81001 URINALYSIS AUTO W/SCOPE: CPT

## 2024-04-04 ENCOUNTER — TELEPHONE (OUTPATIENT)
Dept: NEPHROLOGY | Facility: CLINIC | Age: 89
End: 2024-04-04

## 2024-04-04 NOTE — TELEPHONE ENCOUNTER
I called and left a detailed message for patient regarding stable labs. If he has any questions to call office back.       ----- Message from CROW Espinoza sent at 4/4/2024 12:28 PM EDT -----  Lab work reviewed, no acute findings, kidney function stable. We will discuss all in detail at upcoming appt on the 11th.

## 2024-04-11 ENCOUNTER — OFFICE VISIT (OUTPATIENT)
Dept: NEPHROLOGY | Facility: CLINIC | Age: 89
End: 2024-04-11

## 2024-04-11 VITALS
DIASTOLIC BLOOD PRESSURE: 60 MMHG | BODY MASS INDEX: 24.49 KG/M2 | SYSTOLIC BLOOD PRESSURE: 160 MMHG | HEART RATE: 76 BPM | OXYGEN SATURATION: 97 % | WEIGHT: 147 LBS | HEIGHT: 65 IN

## 2024-04-11 DIAGNOSIS — E55.9 VITAMIN D DEFICIENCY: ICD-10-CM

## 2024-04-11 DIAGNOSIS — E11.22 TYPE 2 DIABETES MELLITUS WITH STAGE 3B CHRONIC KIDNEY DISEASE, WITHOUT LONG-TERM CURRENT USE OF INSULIN (HCC): ICD-10-CM

## 2024-04-11 DIAGNOSIS — E61.1 IRON DEFICIENCY: ICD-10-CM

## 2024-04-11 DIAGNOSIS — N18.32 CHRONIC KIDNEY DISEASE (CKD) STAGE G3B/A1, MODERATELY DECREASED GLOMERULAR FILTRATION RATE (GFR) BETWEEN 30-44 ML/MIN/1.73 SQUARE METER AND ALBUMINURIA CREATININE RATIO LESS THAN 30 MG/G (HCC): Primary | ICD-10-CM

## 2024-04-11 DIAGNOSIS — N25.81 SECONDARY HYPERPARATHYROIDISM OF RENAL ORIGIN (HCC): ICD-10-CM

## 2024-04-11 DIAGNOSIS — R80.1 PERSISTENT PROTEINURIA: ICD-10-CM

## 2024-04-11 DIAGNOSIS — N18.32 TYPE 2 DIABETES MELLITUS WITH STAGE 3B CHRONIC KIDNEY DISEASE, WITHOUT LONG-TERM CURRENT USE OF INSULIN (HCC): ICD-10-CM

## 2024-04-11 NOTE — PROGRESS NOTES
Nephrology   Office Follow-Up  Farooq Sloan 89 y.o. male MRN: [id number]    Encounter: [id number]        Assessment & Plan    Farooq Sloan was seen in the [place] office today. All diagnoses and orders for visit:     1. Chronic kidney disease (CKD) stage G3b/A1, moderately decreased glomerular filtration rate (GFR) between 30-44 mL/min/1.73 square meter and albuminuria creatinine ratio less than 30 mg/g ([facility])  Baseline creatinine 1.2-1.4 mg/dL dating back to 2018. Most recent creatinine 1.2 mg/dL. Grade a2 albuminuria. 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 States home readings are appropriate, 120-140s systolic. No changes made  A1C 6.3%- remains on lisinopril. Would not start SGLT2i/AMC at this time. Patient does not want any more pills and risks potentially outweigh benefits at this time  Persistent proteinuria- will rule out monoclonal gammopathy   -     Comprehensive metabolic panel; Future; Expected date: 10/07/2024  -     CBC and differential; Future; Expected date: 10/07/2024  -     Magnesium; Future; Expected date: 10/07/2024  -     Urinalysis with microscopic; Future; Expected date: 10/07/2024  -     Phosphorus; Future; Expected date: 10/07/2024  -     Albumin / creatinine urine ratio; Future; Expected date: 10/07/2024  2. Vitamin D deficiency  Recommend cholecalciferol 2,000 units daily  -     Vitamin D 25 hydroxy; Future; Expected date: 10/07/2024  3. Persistent proteinuria  Likely due to diabetic nephropathy. Continue lisinopril  -     Protein electrophoresis, serum; Future; Expected date: 10/07/2024  -     Protein electrophoresis, urine; Future; Expected date: 10/07/2024  4. Secondary hyperparathyroidism of renal origin (HCC)  -     PTH, intact; Future; Expected date: 10/07/2024  5. Iron deficiency  -     Iron Panel (Includes Ferritin, Iron Sat%, Iron, and TIBC); Future; Expected date: 10/07/2024  6.  Type 2 diabetes mellitus with stage 3b chronic kidney disease, without long-term current use of insulin ([facility])  A1C 6.3%- remains on lisinopril. Would not start SGLT2i/AMC at this time        HPI: Farooq Sloan is a 89 y.o. male who is here for scheduled follow-up     Pertinent problems include CKD 3, T2DM, renal cyst. He underwent open bilateral inguinal hernia repair with Dr. Nash last year. He still goes to the gym 3 times per week.    Kidney function stable. Magnesium mildly low. Recommend oral magnesium supplement OTC once daily. Recommend stopping ergocalciferol and using cholecalciferol 2,000 units daily. Repeat labs in 6 months.       ROS:   Review of Systems   Constitutional:  Negative for chills and fever.   HENT:  Negative for ear pain and sore throat.    Eyes:  Negative for pain and visual disturbance.   Respiratory:  Negative for cough and shortness of breath.    Cardiovascular:  Negative for chest pain and palpitations.   Gastrointestinal:  Negative for abdominal pain and vomiting.   Genitourinary:  Negative for dysuria and hematuria.   Musculoskeletal:  Negative for arthralgias and back pain.   Skin:  Negative for color change and rash.   Neurological:  Negative for seizures and syncope.   All other systems reviewed and are negative.      Allergies: Patient has no known allergies.    Medications:   Current Outpatient Medications:     acetaminophen (TYLENOL) 500 mg tablet, Take 2 tablets (1,000 mg total) by mouth every 6 (six) hours as needed for mild pain, Disp: , Rfl: 0    amLODIPine (NORVASC) 10 mg tablet, Take 1 tablet (10 mg total) by mouth daily, Disp: 90 tablet, Rfl: 1    aspirin (ECOTRIN LOW STRENGTH) 81 mg EC tablet, Take 81 mg by mouth daily, Disp: , Rfl:     CINNAMON PO, Take by mouth daily, Disp: , Rfl:     doxazosin (CARDURA) 2 mg tablet, Take 1 tablet (2 mg total) by mouth daily at bedtime, Disp: 90 tablet, Rfl: 3    lisinopril (ZESTRIL) 20 mg tablet, Take 1 tablet (20 mg total) by  "mouth daily, Disp: 90 tablet, Rfl: 2    metFORMIN (GLUCOPHAGE) 500 mg tablet, Take 1 tablet (500 mg total) by mouth daily with breakfast, Disp: 90 tablet, Rfl: 1    simvastatin (ZOCOR) 20 mg tablet, Take 1 tablet (20 mg total) by mouth daily at bedtime, Disp: 30 tablet, Rfl: 1    simvastatin (ZOCOR) 20 mg tablet, Take 1 tablet (20 mg total) by mouth daily at bedtime, Disp: 90 tablet, Rfl: 1    Past Medical History:   Diagnosis Date    Aortic aneurysm (HCC)     Back pain     Carotid stenosis     Chronic kidney disease     Diabetes mellitus (HCC)     Epidermoid cyst of skin 11/03/2023    Hyperlipidemia     Hypertension     Incarcerated left inguinal hernia 09/11/2023    Non-recurrent bilateral inguinal hernia without obstruction or gangrene 09/11/2023    Prostate cancer (HCC)      Past Surgical History:   Procedure Laterality Date    CATARACT EXTRACTION Bilateral     COLONOSCOPY      OTHER SURGICAL HISTORY  2005    prostate cancer S/P -- pt states no sx for prostate ca, received radiation tx    MI RPR 1ST INGUN HRNA AGE 7 YRS/> REDUCIBLE Bilateral 11/3/2023    Procedure: REPAIR HERNIA INGUINAL (open) with mesh, excision cyst right groin;  Surgeon: Jerome Nash MD;  Location: [facility];  Service: General    THORACIC AORTIC ANEURYSM REPAIR       Family History   Problem Relation Age of Onset    Cancer Family     Diabetes Family       reports that he quit smoking about 29 years ago. His smoking use included cigarettes. He started smoking about 59 years ago. He has a 15 pack-year smoking history. He has never used smokeless tobacco. He reports current alcohol use of about 5.0 standard drinks of alcohol per week. He reports that he does not use drugs.      Physical Exam:   Vitals:    04/11/24 1241   BP: 160/60   BP Location: Left arm   Patient Position: Sitting   Cuff Size: Standard   Pulse: 76   SpO2: 97%   Weight: 66.7 kg (147 lb)   Height: 5' 5\" (1.651 m)     Body mass index is 24.46 kg/m².    General: conscious, " "cooperative, in no acute distress, appears stated age  Eyes: conjunctivae pale, anicteric sclerae  ENT: lips and mucous membranes moist  Neck: supple, no JVD, no masses  Chest:  essentially clear breath sounds bilaterally, no crackles, ronchus or wheezings  CVS: S1 & S2, normal rate, regular rhythm  Abdomen: soft, non-tender, non-distended, normoactive bowel sounds, rounded  Extremities: no edema of both legs  Skin: no rash   Neuro: awake, alert, oriented       Diagnostic Data:  Lab: I have personally reviewed pertinent lab results.,   CBC:       CMP: No results found for: \"SODIUM\", \"K\", \"CL\", \"CO2\", \"ANIONGAP\", \"BUN\", \"CREATININE\", \"GLUCOSE\", \"CALCIUM\", \"AST\", \"ALT\", \"ALKPHOS\", \"PROT\", \"BILITOT\", \"EGFR\",   PT/INR: No results found for: \"PT\", \"INR\",   Magnesium: No components found for: \"MAG\",  Phosphorous: No results found for: \"PHOS\"    Patient Instructions   It was nice to see you today. You can stop vitamin D2 pill which is a strong vitamin D supplement  You may start an over the counter vitamin D supplement cholecalciferol 2,000 units per week   You can use magnesium oxide up to 500 mg daily for moderately low magnesium level  Repeat labs in 6 months     Portions of the record may have been created with voice recognition software. Occasional wrong word or \"sound a like\" substitutions may have occurred due to the inherent limitations of voice recognition software. Read the chart carefully and recognize, using context, where substitutions have occurred.    If you have any questions, please contact the dictating provider.  "

## 2024-04-11 NOTE — PATIENT INSTRUCTIONS
It was nice to see you today. You can stop vitamin D2 pill which is a strong vitamin D supplement  You may start an over the counter vitamin D supplement cholecalciferol 2,000 units per week   You can use magnesium oxide up to 500 mg daily for moderately low magnesium level  Repeat labs in 6 months

## 2024-04-12 ENCOUNTER — TELEPHONE (OUTPATIENT)
Dept: NEPHROLOGY | Facility: CLINIC | Age: 89
End: 2024-04-12

## 2024-04-12 NOTE — TELEPHONE ENCOUNTER
I called and left a VM to call the office back so we can further assist with getting scheduled for a US.

## 2024-04-25 ENCOUNTER — HOSPITAL ENCOUNTER (OUTPATIENT)
Dept: ULTRASOUND IMAGING | Facility: HOSPITAL | Age: 89
End: 2024-04-25
Payer: MEDICARE

## 2024-04-25 DIAGNOSIS — N28.1 KIDNEY CYST, ACQUIRED: ICD-10-CM

## 2024-04-25 PROCEDURE — 76775 US EXAM ABDO BACK WALL LIM: CPT

## 2024-05-01 DIAGNOSIS — N28.1 KIDNEY CYST, ACQUIRED: Primary | ICD-10-CM

## 2024-05-02 ENCOUNTER — TELEPHONE (OUTPATIENT)
Dept: NEPHROLOGY | Facility: CLINIC | Age: 89
End: 2024-05-02

## 2024-05-02 NOTE — TELEPHONE ENCOUNTER
I called and left aq message for patient to call back to obtain results.         ----- Message from CROW Espinoza sent at 5/1/2024  3:12 PM EDT -----  Current cysts are stable and there are no new cyst.  Continue surveillance ultrasound every 6 months. I will order for October prior to visit with Dr. Llamas (10/21) please assist with scheduling before 10/21

## 2024-05-02 NOTE — TELEPHONE ENCOUNTER
Called patient back- I read him his test results and recommendations from Vanesa.    He verbalized understanding, but he does not want to schedule the ultrasound at this time, he said he will call back.

## 2024-05-17 DIAGNOSIS — I10 HYPERTENSION, UNSPECIFIED TYPE: ICD-10-CM

## 2024-05-17 DIAGNOSIS — E78.5 HYPERLIPIDEMIA, UNSPECIFIED HYPERLIPIDEMIA TYPE: ICD-10-CM

## 2024-05-18 RX ORDER — SIMVASTATIN 20 MG
20 TABLET ORAL
Qty: 90 TABLET | Refills: 1 | Status: SHIPPED | OUTPATIENT
Start: 2024-05-18

## 2024-05-18 RX ORDER — AMLODIPINE BESYLATE 10 MG/1
10 TABLET ORAL DAILY
Qty: 90 TABLET | Refills: 1 | Status: SHIPPED | OUTPATIENT
Start: 2024-05-18

## 2024-05-28 DIAGNOSIS — E11.22 TYPE 2 DIABETES MELLITUS WITH STAGE 3B CHRONIC KIDNEY DISEASE, WITHOUT LONG-TERM CURRENT USE OF INSULIN (HCC): ICD-10-CM

## 2024-05-28 DIAGNOSIS — N18.32 TYPE 2 DIABETES MELLITUS WITH STAGE 3B CHRONIC KIDNEY DISEASE, WITHOUT LONG-TERM CURRENT USE OF INSULIN (HCC): ICD-10-CM

## 2024-05-28 NOTE — TELEPHONE ENCOUNTER
Reason for call:   [x] Refill   [] Prior Auth  [] Other:     Office:   [x] PCP/Provider - Dr Siddiqui   [] Specialty/Provider -     Medication: metformin    Dose/Frequency: 500 mg daily    Quantity: 90D w refill     Pharmacy: [facility]     Does the patient have enough for 3 days?   [] Yes   [x] No - Send as HP to POD

## 2024-06-11 NOTE — H&P (VIEW-ONLY)
Assessment/Plan:    No problem-specific Assessment & Plan notes found for this encounter. Diagnoses and all orders for this visit:    Type 2 diabetes mellitus with stage 3b chronic kidney disease, without long-term current use of insulin ([facility])  Comments:  dec metformin 500 twice daily to 500 daily A1c 6.3,  Recheck in December A1c if still 6.3 discontinue metformin  Has healthy diet and exercises 4 times per week  Orders:  -     HEMOGLOBIN A1C W/ EAG ESTIMATION; Future    Vitamin D deficiency  Comments:  Has been taking vitamin D 50,000 units weekly prescribed by nephrology    Essential hypertension  Comments:  BP home log around 130s/70s  Continue Norvasc 10 mg lisinopril 20 mg  Denies lightheadedness dizziness          Decreased his metformin from 500 twice daily to 500 daily In August his hemoglobin A1c is 6.3 is overcorrected for his age. I will recheck his hemoglobin A1c in December. If it still less than 7 I will discontinue his metformin. Patient is following healthy diet unfortunately he is not going to gym secondary to hernia. After the surgery he will start to go to gym 3-4 times per week. Regarding hypertension he will continue to take Norvasc 10 mg and lisinopril milligrams daily. I will recheck his blood pressure in December if it still soft for his age I will discontinue Norvasc and keep ACE for his blood pressure in the setting of diabetes and chronic kidney disease. Follows up with eye doctor in [facility]. He will have umbilical hernia repair in October. Subjective:      Patient ID: Rasta Sarmiento is a 82 y.o. male. HPI  Is here for hypertension and diabetes follow-up  Decreased his metformin from 500 twice daily to 500 daily In August his hemoglobin A1c is 6.3 is overcorrected for his age. I will recheck his hemoglobin A1c in December. If it still less than 7 I will discontinue his metformin.   Patient is following healthy diet unfortunately he is not going to gym secondary to hernia. After the surgery he will start to go to gym 3-4 times per week. Regarding hypertension he will continue to take Norvasc 10 mg and lisinopril milligrams daily. I will recheck his blood pressure in December if it still not at the goal I will discontinue Norvasc. Follows up with eye doctor in [facility].     Patient sees ophthalmology in [facility]. Does see podiatry. He does not have any numbness tingling or weakness in his lower extremities. Denies smoking, recreational drugs. Alcohol occasionally. He is following up with nephrology. The following portions of the patient's history were reviewed and updated as appropriate: allergies, current medications, past family history, past medical history, past social history, past surgical history and problem list.    Review of Systems   Constitutional: Negative for chills and fever. HENT: Negative for ear pain and sore throat. Eyes: Negative for pain and visual disturbance. Respiratory: Negative for cough and shortness of breath. Cardiovascular: Negative for chest pain and palpitations. Gastrointestinal: Negative for abdominal pain and vomiting. Genitourinary: Negative for dysuria and hematuria. Skin: Negative for color change and rash. Neurological: Negative for seizures and syncope. All other systems reviewed and are negative. Objective:      /64 (BP Location: Left arm, Patient Position: Sitting)   Pulse 80   Temp 97.7 °F (36.5 °C) (Tympanic)   Ht 5' 5" (1.651 m)   Wt 66.1 kg (145 lb 12.8 oz)   SpO2 97%   BMI 24.26 kg/m²          Physical Exam  Vitals reviewed. HENT:      Head: Normocephalic and atraumatic.       Right Ear: Tympanic membrane normal.      Left Ear: Tympanic membrane normal.      Nose: Nose normal.      Mouth/Throat:      Mouth: Mucous membranes are moist.   Eyes:      Conjunctiva/sclera: Conjunctivae normal.      Pupils: Pupils are equal, round, and reactive to light. Cardiovascular:      Rate and Rhythm: Normal rate and regular rhythm. Pulses: Normal pulses. Heart sounds: Normal heart sounds. Pulmonary:      Effort: Pulmonary effort is normal.      Breath sounds: Normal breath sounds. Musculoskeletal:      Right lower leg: No edema. Left lower leg: No edema. Skin:     General: Skin is warm. Capillary Refill: Capillary refill takes less than 2 seconds. Neurological:      General: No focal deficit present. Mental Status: He is alert and oriented to person, place, and time. Psychiatric:         Mood and Affect: Mood normal.         Thought Content:  Thought content normal. aspirin 81 mg oral tablet, chewable: 1 tab(s) orally once a day (at bedtime)  atorvastatin 40 mg oral tablet: 1 tab(s) orally once a day (at bedtime)  clopidogrel 75 mg oral tablet: 1 tab(s) orally once a day  sotalol 80 mg oral tablet: 1 tab(s) orally every 12 hours MDD: 2

## 2024-08-13 DIAGNOSIS — N18.32 CHRONIC KIDNEY DISEASE (CKD) STAGE G3B/A1, MODERATELY DECREASED GLOMERULAR FILTRATION RATE (GFR) BETWEEN 30-44 ML/MIN/1.73 SQUARE METER AND ALBUMINURIA CREATININE RATIO LESS THAN 30 MG/G (HCC): ICD-10-CM

## 2024-08-13 DIAGNOSIS — I10 ESSENTIAL HYPERTENSION: ICD-10-CM

## 2024-08-13 RX ORDER — DOXAZOSIN 2 MG/1
2 TABLET ORAL
Qty: 90 TABLET | Refills: 1 | Status: SHIPPED | OUTPATIENT
Start: 2024-08-13

## 2024-08-13 NOTE — TELEPHONE ENCOUNTER
Reason for call:   [x] Refill   [] Prior Auth  [] Other:     Office:   [] PCP/Provider -   [x] Specialty/Provider - Dr Rosen    Medication: doxazosin    Dose/Frequency: 2 mg take one tablet at bedtime     Quantity: 90    Pharmacy: [facility] [address]    Does the patient have enough for 3 days?   [x] Yes   [] No - Send as HP to POD

## 2024-08-13 NOTE — TELEPHONE ENCOUNTER
4 months ago CROW Espinoza [facility] [place] Office Visit

## 2024-08-19 DIAGNOSIS — N18.32 TYPE 2 DIABETES MELLITUS WITH STAGE 3B CHRONIC KIDNEY DISEASE, WITHOUT LONG-TERM CURRENT USE OF INSULIN (HCC): ICD-10-CM

## 2024-08-19 DIAGNOSIS — E11.22 TYPE 2 DIABETES MELLITUS WITH STAGE 3B CHRONIC KIDNEY DISEASE, WITHOUT LONG-TERM CURRENT USE OF INSULIN (HCC): ICD-10-CM

## 2024-08-19 NOTE — TELEPHONE ENCOUNTER
Reason for call:   [x] Refill   [] Prior Auth  [] Other:     Office:   [x] PCP/Provider -   [] Specialty/Provider -     Medication: METFORMIN    Dose/Frequency: 500 MG    Quantity: 90    Pharmacy:   [facility] GERCIA MAYER - [id number]  [address], [place] [address]  Phone: [phone]  Fax: [phone]  FRANCISCO JAVIER #: --     Does the patient have enough for 3 days?   [x] Yes   [] No - Send as HP to POD

## 2024-08-23 NOTE — TELEPHONE ENCOUNTER
Patient calling to inquire why he only received a 30 day supply of metformin instead of a 90 day. Made patient aware the reason was because updated lab work was needed. Patient states he will discuss with his doctor at his appointment next week.

## 2024-09-09 ENCOUNTER — OFFICE VISIT (OUTPATIENT)
Dept: FAMILY MEDICINE CLINIC | Facility: CLINIC | Age: 89
End: 2024-09-09
Payer: MEDICARE

## 2024-09-09 VITALS
SYSTOLIC BLOOD PRESSURE: 126 MMHG | WEIGHT: 147 LBS | OXYGEN SATURATION: 97 % | TEMPERATURE: 97.5 F | HEIGHT: 65 IN | DIASTOLIC BLOOD PRESSURE: 72 MMHG | BODY MASS INDEX: 24.49 KG/M2 | HEART RATE: 83 BPM

## 2024-09-09 DIAGNOSIS — N18.31 STAGE 3A CHRONIC KIDNEY DISEASE (HCC): ICD-10-CM

## 2024-09-09 DIAGNOSIS — I10 ESSENTIAL HYPERTENSION: ICD-10-CM

## 2024-09-09 DIAGNOSIS — N18.32 TYPE 2 DIABETES MELLITUS WITH STAGE 3B CHRONIC KIDNEY DISEASE, WITHOUT LONG-TERM CURRENT USE OF INSULIN (HCC): ICD-10-CM

## 2024-09-09 DIAGNOSIS — Z13.31 NEGATIVE DEPRESSION SCREENING: ICD-10-CM

## 2024-09-09 DIAGNOSIS — Z00.00 MEDICARE ANNUAL WELLNESS VISIT, SUBSEQUENT: Primary | ICD-10-CM

## 2024-09-09 DIAGNOSIS — E11.22 TYPE 2 DIABETES MELLITUS WITH STAGE 3B CHRONIC KIDNEY DISEASE, WITHOUT LONG-TERM CURRENT USE OF INSULIN (HCC): ICD-10-CM

## 2024-09-09 DIAGNOSIS — N25.81 SECONDARY HYPERPARATHYROIDISM (HCC): ICD-10-CM

## 2024-09-09 DIAGNOSIS — Z23 ENCOUNTER FOR IMMUNIZATION: ICD-10-CM

## 2024-09-09 PROBLEM — I71.20 THORACIC AORTIC ANEURYSM WITHOUT RUPTURE (HCC): Status: RESOLVED | Noted: 2023-09-19 | Resolved: 2024-09-09

## 2024-09-09 LAB — SL AMB POCT HEMOGLOBIN AIC: 6 (ref ?–6.5)

## 2024-09-09 PROCEDURE — G0439 PPPS, SUBSEQ VISIT: HCPCS | Performed by: FAMILY MEDICINE

## 2024-09-09 PROCEDURE — 99213 OFFICE O/P EST LOW 20 MIN: CPT | Performed by: FAMILY MEDICINE

## 2024-09-09 PROCEDURE — 83036 HEMOGLOBIN GLYCOSYLATED A1C: CPT | Performed by: FAMILY MEDICINE

## 2024-09-09 NOTE — PATIENT INSTRUCTIONS
Medicare Preventive Visit Patient Instructions  Thank you for completing your Welcome to Medicare Visit or Medicare Annual Wellness Visit today. Your next wellness visit will be due in one year (9/10/2025).  The screening/preventive services that you may require over the next 5-10 years are detailed below. Some tests may not apply to you based off risk factors and/or age. Screening tests ordered at today's visit but not completed yet may show as past due. Also, please note that scanned in results may not display below.  Preventive Screenings:  Service Recommendations Previous Testing/Comments   Colorectal Cancer Screening  Colonoscopy    Fecal Occult Blood Test (FOBT)/Fecal Immunochemical Test (FIT)  Fecal DNA/Cologuard Test  Flexible Sigmoidoscopy Age: 45-75 years old   Colonoscopy: every 10 years (May be performed more frequently if at higher risk)  OR  FOBT/FIT: every 1 year  OR  Cologuard: every 3 years  OR  Sigmoidoscopy: every 5 years  Screening may be recommended earlier than age 45 if at higher risk for colorectal cancer. Also, an individualized decision between you and your healthcare provider will decide whether screening between the ages of 76-85 would be appropriate. Colonoscopy: Not on file  FOBT/FIT: Not on file  Cologuard: Not on file  Sigmoidoscopy: Not on file    Screening Not Indicated     Prostate Cancer Screening Individualized decision between patient and health care provider in men between ages of 55-69   Medicare will cover every 12 months beginning on the day after your 50th birthday PSA: 0.16 ng/mL     History Prostate Cancer  Screening Not Indicated     Hepatitis C Screening Once for adults born between 1945 and 1965  More frequently in patients at high risk for Hepatitis C Hep C Antibody: Not on file        Diabetes Screening 1-2 times per year if you're at risk for diabetes or have pre-diabetes Fasting glucose: 118 mg/dL (4/3/2024)  A1C: 6.3 % (12/6/2023)  Screening Not Indicated  History  Diabetes   Cholesterol Screening Once every 5 years if you don't have a lipid disorder. May order more often based on risk factors. Lipid panel: 08/23/2023  Screening Not Indicated  History Lipid Disorder      Other Preventive Screenings Covered by Medicare:  Abdominal Aortic Aneurysm (AAA) Screening: covered once if your at risk. You're considered to be at risk if you have a family history of AAA or a male between the age of 65-75 who smoking at least 100 cigarettes in your lifetime.  Lung Cancer Screening: covers low dose CT scan once per year if you meet all of the following conditions: (1) Age 55-77; (2) No signs or symptoms of lung cancer; (3) Current smoker or have quit smoking within the last 15 years; (4) You have a tobacco smoking history of at least 20 pack years (packs per day x number of years you smoked); (5) You get a written order from a healthcare provider.  Glaucoma Screening: covered annually if you're considered high risk: (1) You have diabetes OR (2) Family history of glaucoma OR (3)  aged 50 and older OR (4)  American aged 65 and older  Osteoporosis Screening: covered every 2 years if you meet one of the following conditions: (1) Have a vertebral abnormality; (2) On glucocorticoid therapy for more than 3 months; (3) Have primary hyperparathyroidism; (4) On osteoporosis medications and need to assess response to drug therapy.  HIV Screening: covered annually if you're between the age of 15-65. Also covered annually if you are younger than 15 and older than 65 with risk factors for HIV infection. For pregnant patients, it is covered up to 3 times per pregnancy.    Immunizations:  Immunization Recommendations   Influenza Vaccine Annual influenza vaccination during flu season is recommended for all persons aged >= 6 months who do not have contraindications   Pneumococcal Vaccine   * Pneumococcal conjugate vaccine = PCV13 (Prevnar 13), PCV15 (Vaxneuvance), PCV20 (Prevnar  20)  * Pneumococcal polysaccharide vaccine = PPSV23 (Pneumovax) Adults 19-63 yo with certain risk factors or if 65+ yo  If never received any pneumonia vaccine: recommend Prevnar 20 (PCV20)  Give PCV20 if previously received 1 dose of PCV13 or PPSV23   Hepatitis B Vaccine 3 dose series if at intermediate or high risk (ex: diabetes, end stage renal disease, liver disease)   Respiratory syncytial virus (RSV) Vaccine - COVERED BY MEDICARE PART D  * RSVPreF3 (Arexvy) CDC recommends that adults 60 years of age and older may receive a single dose of RSV vaccine using shared clinical decision-making (SCDM)   Tetanus (Td) Vaccine - COST NOT COVERED BY MEDICARE PART B Following completion of primary series, a booster dose should be given every 10 years to maintain immunity against tetanus. Td may also be given as tetanus wound prophylaxis.   Tdap Vaccine - COST NOT COVERED BY MEDICARE PART B Recommended at least once for all adults. For pregnant patients, recommended with each pregnancy.   Shingles Vaccine (Shingrix) - COST NOT COVERED BY MEDICARE PART B  2 shot series recommended in those 19 years and older who have or will have weakened immune systems or those 50 years and older     Health Maintenance Due:  There are no preventive care reminders to display for this patient.  Immunizations Due:      Topic Date Due   • Pneumococcal Vaccine: 65+ Years (1 of 2 - PCV) Never done   • COVID-19 Vaccine (4 - 2023-24 season) 09/01/2024   • Influenza Vaccine (1) 09/01/2024     Advance Directives   What are advance directives?  Advance directives are legal documents that state your wishes and plans for medical care. These plans are made ahead of time in case you lose your ability to make decisions for yourself. Advance directives can apply to any medical decision, such as the treatments you want, and if you want to donate organs.   What are the types of advance directives?  There are many types of advance directives, and each state  has rules about how to use them. You may choose a combination of any of the following:  Living will:  This is a written record of the treatment you want. You can also choose which treatments you do not want, which to limit, and which to stop at a certain time. This includes surgery, medicine, IV fluid, and tube feedings.   Durable power of  for healthcare (DPAHC):  This is a written record that states who you want to make healthcare choices for you when you are unable to make them for yourself. This person, called a proxy, is usually a family member or a friend. You may choose more than 1 proxy.  Do not resuscitate (DNR) order:  A DNR order is used in case your heart stops beating or you stop breathing. It is a request not to have certain forms of treatment, such as CPR. A DNR order may be included in other types of advance directives.  Medical directive:  This covers the care that you want if you are in a coma, near death, or unable to make decisions for yourself. You can list the treatments you want for each condition. Treatment may include pain medicine, surgery, blood transfusions, dialysis, IV or tube feedings, and a ventilator (breathing machine).  Values history:  This document has questions about your views, beliefs, and how you feel and think about life. This information can help others choose the care that you would choose.  Why are advance directives important?  An advance directive helps you control your care. Although spoken wishes may be used, it is better to have your wishes written down. Spoken wishes can be misunderstood, or not followed. Treatments may be given even if you do not want them. An advance directive may make it easier for your family to make difficult choices about your care.       © Copyright [employer] 2018 Information is for End User's use only and may not be sold, redistributed or otherwise used for commercial purposes. All illustrations and images included in  CareNotes® are the copyrighted property of A.D.A.M., Inc. or [employer]

## 2024-09-09 NOTE — PROGRESS NOTES
Ambulatory Visit  Name: Farooq Sloan      : 1935      MRN: [id number]  Encounter Provider: Owen Sneed DO  Encounter Date: 2024   Encounter department: [facility]    Assessment & Plan   1. Medicare annual wellness visit, subsequent  2. Type 2 diabetes mellitus with stage 3b chronic kidney disease, without long-term current use of insulin (HCC)  Comments:  no change in the medication  Orders:  -     POCT hemoglobin A1c  -     Hemoglobin A1C; Future  -     Glucose, fasting; Future  -     metFORMIN (GLUCOPHAGE) 500 mg tablet; Take 1 tablet (500 mg total) by mouth daily with breakfast  3. Essential hypertension  4. Secondary hyperparathyroidism (HCC)  5. Stage 3a chronic kidney disease (HCC)  Comments:  per nephrology  6. Encounter for immunization  -     Pneumococcal Conjugate Vaccine 20-valent (Pcv20)  7. Negative depression screening  8. Type 2 diabetes mellitus with stage 3b chronic kidney disease, without long-term current use of insulin (HCC)  Comments:  A1c 6.2 overcorrected for his age  Orders:  -     POCT hemoglobin A1c  -     Hemoglobin A1C; Future  -     Glucose, fasting; Future  -     metFORMIN (GLUCOPHAGE) 500 mg tablet; Take 1 tablet (500 mg total) by mouth daily with breakfast       Preventive health issues were discussed with patient, and age appropriate screening tests were ordered as noted in patient's After Visit Summary. Personalized health advice and appropriate referrals for health education or preventive services given if needed, as noted in patient's After Visit Summary.    History of Present Illness     Diabetes  He presents for his follow-up diabetic visit. He has type 2 diabetes mellitus. His disease course has been stable. Pertinent negatives for hypoglycemia include no seizures. Pertinent negatives for diabetes include no chest pain and no fatigue.      Patient Care Team:  Owen Sneed DO as PCP - General (Family Medicine)  Vanesa  CROW Luciano as Nurse Practitioner (Nephrology)    Review of Systems   Constitutional: Negative.  Negative for chills, fatigue and fever.   HENT: Negative.  Negative for hearing loss.    Eyes: Negative.  Negative for visual disturbance.   Respiratory:  Positive for shortness of breath. Negative for wheezing.    Cardiovascular:  Negative for chest pain and palpitations.   Gastrointestinal:  Negative for abdominal pain, blood in stool, constipation, diarrhea, nausea and vomiting.   Endocrine: Negative.    Genitourinary:  Negative for difficulty urinating and dysuria.   Musculoskeletal:  Negative for arthralgias and myalgias.   Skin: Negative.    Allergic/Immunologic: Negative.    Neurological:  Negative for seizures and syncope.   Hematological:  Negative for adenopathy.   Psychiatric/Behavioral: Negative.       Medical History Reviewed by provider this encounter:       Annual Wellness Visit Questionnaire   Farooq is here for his Subsequent Wellness visit. Last Medicare Wellness visit information reviewed, patient interviewed and updates made to the record today.      Health Risk Assessment:   Patient rates overall health as fair. Patient feels that their physical health rating is slightly worse. Patient is satisfied with their life. Eyesight was rated as same. Hearing was rated as slightly worse. Patient feels that their emotional and mental health rating is same. Patients states they are never, rarely angry. Patient states they are never, rarely unusually tired/fatigued. Pain experienced in the last 7 days has been none. Patient states that he has experienced no weight loss or gain in last 6 months.     Depression Screening:   PHQ-2 Score: 0      Fall Risk Screening:   In the past year, patient has experienced: no history of falling in past year      Home Safety:  Patient does not have trouble with stairs inside or outside of their home. Patient has working smoke alarms and has working carbon monoxide detector. Home  safety hazards include: none.     Nutrition:   Current diet is Regular.     Medications:   Patient is currently taking over-the-counter supplements. OTC medications include: see medication list. Patient is able to manage medications.     Activities of Daily Living (ADLs)/Instrumental Activities of Daily Living (IADLs):   Walk and transfer into and out of bed and chair?: Yes  Dress and groom yourself?: Yes    Bathe or shower yourself?: Yes    Feed yourself? Yes  Do your laundry/housekeeping?: Yes  Manage your money, pay your bills and track your expenses?: Yes  Make your own meals?: Yes    Do your own shopping?: Yes    Previous Hospitalizations:   Any hospitalizations or ED visits within the last 12 months?: Yes    How many hospitalizations have you had in the last year?: 1-2    Hospitalization Comments: Hernia surgery     Advance Care Planning:   Living will: Yes    Durable POA for healthcare: Yes    Advanced directive: Yes    Advanced directive counseling given: Yes    Five wishes given: No    Patient declined ACP directive: No    End of Life Decisions reviewed with patient: Yes    Provider agrees with end of life decisions: Yes      Cognitive Screening:   Provider or family/friend/caregiver concerned regarding cognition?: No    PREVENTIVE SCREENINGS      Cardiovascular Screening:    General: Screening Not Indicated and History Lipid Disorder      Diabetes Screening:     General: Screening Not Indicated and History Diabetes      Colorectal Cancer Screening:     General: Screening Not Indicated      Prostate Cancer Screening:    General: History Prostate Cancer and Screening Not Indicated      Abdominal Aortic Aneurysm (AAA) Screening:    Risk factors include: tobacco use        Lung Cancer Screening:     General: Screening Not Indicated    Screening, Brief Intervention, and Referral to Treatment (SBIRT)    Screening      Single Item Drug Screening:  How often have you used an illegal drug (including marijuana) or a  "prescription medication for non-medical reasons in the past year? never    Single Item Drug Screen Score: 0  Interpretation: Negative screen for possible drug use disorder       No results found.    Objective     /72   Pulse 83   Temp 97.5 °F (36.4 °C)   Ht 5' 5\" (1.651 m)   Wt 66.7 kg (147 lb)   SpO2 97%   BMI 24.46 kg/m²     Physical Exam  Vitals and nursing note reviewed.   Constitutional:       General: He is not in acute distress.     Appearance: Normal appearance. He is well-developed. He is not ill-appearing, toxic-appearing or diaphoretic.   HENT:      Head: Normocephalic and atraumatic.      Right Ear: Tympanic membrane, ear canal and external ear normal. There is no impacted cerumen.      Left Ear: Tympanic membrane, ear canal and external ear normal. There is no impacted cerumen.      Nose: Nose normal. No congestion or rhinorrhea.      Mouth/Throat:      Mouth: Mucous membranes are moist.      Pharynx: No oropharyngeal exudate or posterior oropharyngeal erythema.   Eyes:      General: No scleral icterus.        Right eye: No discharge.         Left eye: No discharge.      Conjunctiva/sclera: Conjunctivae normal.      Pupils: Pupils are equal, round, and reactive to light.   Neck:      Thyroid: No thyromegaly.      Trachea: No tracheal deviation.   Cardiovascular:      Rate and Rhythm: Normal rate and regular rhythm.      Pulses: Normal pulses.      Heart sounds: Normal heart sounds. No murmur heard.  Pulmonary:      Effort: Pulmonary effort is normal. No respiratory distress.      Breath sounds: Normal breath sounds. No stridor. No wheezing or rales.   Abdominal:      General: There is no distension.      Palpations: Abdomen is soft. There is no mass.      Tenderness: There is no abdominal tenderness. There is no guarding or rebound.      Hernia: No hernia is present.   Musculoskeletal:         General: No tenderness or deformity. Normal range of motion.      Cervical back: Normal range of " motion and neck supple. No rigidity.      Right lower leg: No edema.      Left lower leg: No edema.   Lymphadenopathy:      Cervical: No cervical adenopathy.   Skin:     General: Skin is warm.      Findings: No erythema or rash.   Neurological:      Mental Status: He is alert and oriented to person, place, and time.      Sensory: No sensory deficit.      Motor: No abnormal muscle tone.      Deep Tendon Reflexes: Reflexes normal.   Psychiatric:         Mood and Affect: Mood normal.         Behavior: Behavior normal.         Thought Content: Thought content normal.         Judgment: Judgment normal.

## 2024-09-12 DIAGNOSIS — E11.22 TYPE 2 DIABETES MELLITUS WITH STAGE 3B CHRONIC KIDNEY DISEASE, WITHOUT LONG-TERM CURRENT USE OF INSULIN (HCC): ICD-10-CM

## 2024-09-12 DIAGNOSIS — N18.32 TYPE 2 DIABETES MELLITUS WITH STAGE 3B CHRONIC KIDNEY DISEASE, WITHOUT LONG-TERM CURRENT USE OF INSULIN (HCC): ICD-10-CM

## 2024-09-12 NOTE — TELEPHONE ENCOUNTER
Reason for call:   [x] Refill   [] Prior Auth  [] Other:     Office:   [] PCP/Provider -   [] Specialty/Provider -     PATIENT WANTS A 90 DAY SUPPLY    Medication: METFORMIN    Dose/Frequency: 500 MG    Quantity: 90    Pharmacy:   [facility] GRECIA MAYER - [id number]  [address], [place] [address]  Phone: [phone]  Fax: [phone]     Does the patient have enough for 3 days?   [x] Yes   [] No - Send as HP to POD

## 2024-10-01 ENCOUNTER — TELEPHONE (OUTPATIENT)
Dept: NEPHROLOGY | Facility: CLINIC | Age: 89
End: 2024-10-01

## 2024-10-01 ENCOUNTER — HOSPITAL ENCOUNTER (OUTPATIENT)
Dept: ULTRASOUND IMAGING | Facility: HOSPITAL | Age: 89
Discharge: HOME | End: 2024-10-01
Payer: MEDICARE

## 2024-10-01 DIAGNOSIS — N28.1 KIDNEY CYST, ACQUIRED: ICD-10-CM

## 2024-10-01 PROCEDURE — 76775 US EXAM ABDO BACK WALL LIM: CPT

## 2024-10-01 NOTE — TELEPHONE ENCOUNTER
I called and spoke with Farooq regarding completing lab work prior to his upcoming appt on 10/21/2024.

## 2024-10-03 ENCOUNTER — TELEPHONE (OUTPATIENT)
Dept: NEPHROLOGY | Facility: CLINIC | Age: 89
End: 2024-10-03

## 2024-10-17 DIAGNOSIS — I10 ESSENTIAL HYPERTENSION: ICD-10-CM

## 2024-10-17 DIAGNOSIS — N18.32 CHRONIC KIDNEY DISEASE (CKD) STAGE G3B/A1, MODERATELY DECREASED GLOMERULAR FILTRATION RATE (GFR) BETWEEN 30-44 ML/MIN/1.73 SQUARE METER AND ALBUMINURIA CREATININE RATIO LESS THAN 30 MG/G (HCC): ICD-10-CM

## 2024-10-17 NOTE — TELEPHONE ENCOUNTER
Reason for call:   [x] Refill   [] Prior Auth  [x] Other: patient requesting 3 refills to be added    Office:   [x] PCP/Provider - Owen Siddiqui DO   [] Specialty/Provider -     Medication:   lisinopril (ZESTRIL) 20 mg tablet     Dose/Frequency: 20mg    Quantity: 90    Pharmacy: [address]     Does the patient have enough for 3 days?   [x] Yes   [] No - Send as HP to POD

## 2024-10-18 RX ORDER — LISINOPRIL 20 MG/1
20 TABLET ORAL DAILY
Qty: 90 TABLET | Refills: 1 | Status: SHIPPED | OUTPATIENT
Start: 2024-10-18

## 2024-11-25 DIAGNOSIS — E78.5 HYPERLIPIDEMIA, UNSPECIFIED HYPERLIPIDEMIA TYPE: ICD-10-CM

## 2024-11-25 RX ORDER — SIMVASTATIN 20 MG
20 TABLET ORAL
Qty: 30 TABLET | Refills: 0 | Status: SHIPPED | OUTPATIENT
Start: 2024-11-25

## 2024-11-25 NOTE — TELEPHONE ENCOUNTER
Reason for call:   [x] Refill   [] Prior Auth  [] Other:     Office:   [x] PCP/Provider - [place] WELSH  Authorized By: Owen Siddiqui DO  [] Specialty/Provider -     Medication: simvastatin (ZOCOR) 20 mg tablet     Dose/Frequency: Take 1 tablet (20 mg total) by mouth daily at bedtime     Quantity: 90 tablets    Pharmacy: [facility] [address] [place] [address] [facility]     Does the patient have enough for 3 days?   [] Yes   [x] No - Send as HP to POD

## 2024-12-31 DIAGNOSIS — E78.5 HYPERLIPIDEMIA, UNSPECIFIED HYPERLIPIDEMIA TYPE: ICD-10-CM

## 2024-12-31 NOTE — TELEPHONE ENCOUNTER
Reason for call: pt requesting a 6 mo supply at one time.   [x] Refill   [] Prior Auth  [] Other:     Office:   [x] PCP/Provider -   [] Specialty/Provider -     Medication: simvastatin (ZOCOR) 20 mg tablet     Dose/Frequency:  Take 1 tablet (20 mg total) by mouth daily at bedtime     Quantity: 90 tablet     Pharmacy: [facility] [address] [place] [address]      Does the patient have enough for 3 days?   [] Yes   [x] No - Send as HP to POD

## 2025-01-02 RX ORDER — SIMVASTATIN 20 MG
20 TABLET ORAL
Qty: 90 TABLET | Refills: 1 | Status: SHIPPED | OUTPATIENT
Start: 2025-01-02

## 2025-02-03 DIAGNOSIS — N18.32 CHRONIC KIDNEY DISEASE (CKD) STAGE G3B/A1, MODERATELY DECREASED GLOMERULAR FILTRATION RATE (GFR) BETWEEN 30-44 ML/MIN/1.73 SQUARE METER AND ALBUMINURIA CREATININE RATIO LESS THAN 30 MG/G (HCC): ICD-10-CM

## 2025-02-03 DIAGNOSIS — I10 ESSENTIAL HYPERTENSION: ICD-10-CM

## 2025-02-03 NOTE — TELEPHONE ENCOUNTER
Reason for call:   [x] Refill   [] Prior Auth  [] Other:     Office:   [] PCP/Provider -   [x] Specialty/Provider - nephrology     Medication: doxazosin    Dose/Frequency: 2 mg take 1 tablet daily at bedtime     Quantity: 90    Pharmacy: [facility] [address]     Does the patient have enough for 3 days?   [x] Yes   [] No - Send as HP to POD

## 2025-02-04 RX ORDER — DOXAZOSIN 2 MG/1
2 TABLET ORAL
Qty: 90 TABLET | Refills: 1 | Status: SHIPPED | OUTPATIENT
Start: 2025-02-04

## 2025-03-13 ENCOUNTER — RA CDI HCC (OUTPATIENT)
Dept: OTHER | Facility: HOSPITAL | Age: 89
End: 2025-03-13

## 2025-03-13 NOTE — PROGRESS NOTES
HCC coding opportunities       Chart reviewed, no opportunity found: CHART REVIEWED, NO OPPORTUNITY FOUND        Patients Insurance        Commercial Insurance: [employer] Insurance

## 2025-03-16 DIAGNOSIS — E11.22 TYPE 2 DIABETES MELLITUS WITH STAGE 3B CHRONIC KIDNEY DISEASE, WITHOUT LONG-TERM CURRENT USE OF INSULIN (HCC): ICD-10-CM

## 2025-03-16 DIAGNOSIS — N18.32 TYPE 2 DIABETES MELLITUS WITH STAGE 3B CHRONIC KIDNEY DISEASE, WITHOUT LONG-TERM CURRENT USE OF INSULIN (HCC): ICD-10-CM

## 2025-03-16 NOTE — TELEPHONE ENCOUNTER
Medication Refill Request       Medication: Metformin    Dose/Frequency: 500mg / tablet- take one tablet by mouth daily with breakfast    Quantity: 90    Pharmacy:   [facility] [id number]  GRECIA MAYER - [address]     Office:   [x] PCP/Provider -   [] Specialty/Provider -     Does the patient have enough for 3 days?   [x] Yes   [] No - Send as HP to POD    Is the patient completely out of the medication or does not have enough until the next business day?  [] Yes - send to Call Hub  [x] No - Send as HP to POD

## 2025-03-19 ENCOUNTER — APPOINTMENT (OUTPATIENT)
Dept: LAB | Facility: CLINIC | Age: 89
End: 2025-03-19
Payer: MEDICARE

## 2025-03-19 DIAGNOSIS — E11.22 TYPE 2 DIABETES MELLITUS WITH STAGE 3B CHRONIC KIDNEY DISEASE, WITHOUT LONG-TERM CURRENT USE OF INSULIN (HCC): ICD-10-CM

## 2025-03-19 DIAGNOSIS — N18.32 TYPE 2 DIABETES MELLITUS WITH STAGE 3B CHRONIC KIDNEY DISEASE, WITHOUT LONG-TERM CURRENT USE OF INSULIN (HCC): ICD-10-CM

## 2025-03-19 PROCEDURE — 36415 COLL VENOUS BLD VENIPUNCTURE: CPT

## 2025-03-19 PROCEDURE — 83036 HEMOGLOBIN GLYCOSYLATED A1C: CPT

## 2025-03-19 PROCEDURE — 82947 ASSAY GLUCOSE BLOOD QUANT: CPT

## 2025-03-20 ENCOUNTER — OFFICE VISIT (OUTPATIENT)
Dept: FAMILY MEDICINE CLINIC | Facility: CLINIC | Age: 89
End: 2025-03-20
Payer: MEDICARE

## 2025-03-20 VITALS
HEART RATE: 64 BPM | BODY MASS INDEX: 24.76 KG/M2 | TEMPERATURE: 96.5 F | SYSTOLIC BLOOD PRESSURE: 132 MMHG | DIASTOLIC BLOOD PRESSURE: 70 MMHG | HEIGHT: 65 IN | OXYGEN SATURATION: 96 % | WEIGHT: 148.6 LBS

## 2025-03-20 DIAGNOSIS — Z23 ENCOUNTER FOR IMMUNIZATION: ICD-10-CM

## 2025-03-20 DIAGNOSIS — Z12.5 SCREENING FOR PROSTATE CANCER: ICD-10-CM

## 2025-03-20 DIAGNOSIS — E78.5 HYPERLIPIDEMIA, UNSPECIFIED HYPERLIPIDEMIA TYPE: ICD-10-CM

## 2025-03-20 DIAGNOSIS — E11.22 TYPE 2 DIABETES MELLITUS WITH STAGE 3B CHRONIC KIDNEY DISEASE, WITHOUT LONG-TERM CURRENT USE OF INSULIN (HCC): Primary | ICD-10-CM

## 2025-03-20 DIAGNOSIS — N18.32 TYPE 2 DIABETES MELLITUS WITH STAGE 3B CHRONIC KIDNEY DISEASE, WITHOUT LONG-TERM CURRENT USE OF INSULIN (HCC): Primary | ICD-10-CM

## 2025-03-20 DIAGNOSIS — I10 ESSENTIAL HYPERTENSION: ICD-10-CM

## 2025-03-20 LAB
EST. AVERAGE GLUCOSE BLD GHB EST-MCNC: 137 MG/DL
GLUCOSE P FAST SERPL-MCNC: 124 MG/DL (ref 65–99)
HBA1C MFR BLD: 6.4 %

## 2025-03-20 PROCEDURE — G2211 COMPLEX E/M VISIT ADD ON: HCPCS | Performed by: FAMILY MEDICINE

## 2025-03-20 PROCEDURE — 99214 OFFICE O/P EST MOD 30 MIN: CPT | Performed by: FAMILY MEDICINE

## 2025-03-20 NOTE — PROGRESS NOTES
"Assessment/Plan:    No problem-specific Assessment & Plan notes found for this encounter.       Diagnoses and all orders for this visit:    Type 2 diabetes mellitus with stage 3b chronic kidney disease, without long-term current use of insulin (HCC)  -     Diabetic foot exam; Future  -     Hemoglobin A1C; Future    Essential hypertension  -     CBC and differential; Future  -     TSH, 3rd generation with Free T4 reflex; Future    Hyperlipidemia, unspecified hyperlipidemia type  Comments:  pt counseled on diet and exercise  Orders:  -     Comprehensive metabolic panel; Future  -     Lipid panel; Future    Encounter for immunization    Screening for prostate cancer  -     PSA, Total Screen; Future          PHQ-2/9 Depression Screening    Little interest or pleasure in doing things: 0 - not at all  Feeling down, depressed, or hopeless: 0 - not at all  PHQ-2 Score: 0  PHQ-2 Interpretation: Negative depression screen            Subjective:      Patient ID: Farooq Sloan is a 89 y.o. male.    Diabetes  He presents for his follow-up diabetic visit. He has type 2 diabetes mellitus. His disease course has been stable. Pertinent negatives for diabetes include no chest pain.       The following portions of the patient's history were reviewed and updated as appropriate: allergies, current medications, past family history, past medical history, past social history, past surgical history, and problem list.    Review of Systems   Eyes:  Negative for visual disturbance.   Cardiovascular:  Negative for chest pain and palpitations.   Gastrointestinal:  Negative for abdominal pain, nausea and vomiting.   Genitourinary:  Negative for frequency.   Skin:  Negative for wound.   Neurological:  Negative for numbness.         Objective:    /70   Pulse 64   Temp (!) 96.5 °F (35.8 °C) (Tympanic)   Ht 5' 5\" (1.651 m)   Wt 67.4 kg (148 lb 9.6 oz)   SpO2 96%   BMI 24.73 kg/m²      Physical Exam  Vitals and nursing note reviewed. "   Constitutional:       General: He is not in acute distress.     Appearance: Normal appearance. He is not ill-appearing, toxic-appearing or diaphoretic.   HENT:      Head: Normocephalic and atraumatic.      Mouth/Throat:      Mouth: Mucous membranes are moist.   Eyes:      Conjunctiva/sclera: Conjunctivae normal.   Cardiovascular:      Rate and Rhythm: Normal rate and regular rhythm.      Pulses: no weak pulses.           Dorsalis pedis pulses are 2+ on the right side and 2+ on the left side.      Heart sounds: Normal heart sounds. No murmur heard.  Pulmonary:      Effort: Pulmonary effort is normal. No respiratory distress.      Breath sounds: Normal breath sounds. No wheezing, rhonchi or rales.   Abdominal:      General: There is no distension.      Palpations: Abdomen is soft. There is no mass.      Tenderness: There is no abdominal tenderness. There is no guarding or rebound.   Musculoskeletal:      Cervical back: Normal range of motion. No rigidity.      Right lower leg: No edema.      Left lower leg: No edema.   Feet:      Right foot:      Skin integrity: No ulcer, skin breakdown, erythema, warmth, callus or dry skin.      Left foot:      Skin integrity: No ulcer, skin breakdown, erythema, warmth, callus or dry skin.   Lymphadenopathy:      Cervical: No cervical adenopathy.   Neurological:      Mental Status: He is alert and oriented to person, place, and time.   Psychiatric:         Mood and Affect: Mood normal.         Behavior: Behavior normal.         Thought Content: Thought content normal.         Judgment: Judgment normal.       Patient's shoes and socks removed.    Right Foot/Ankle   Right Foot Inspection  Skin Exam: skin normal and skin intact. No dry skin, no warmth, no callus, no erythema, no maceration, no abnormal color, no pre-ulcer, no ulcer and no callus.     Toe Exam: ROM and strength within normal limits.  no right toe deformity    Sensory   Monofilament testing:  intact    Vascular  Capillary refills: < 3 seconds  The right DP pulse is 2+.     Left Foot/Ankle  Left Foot Inspection  Skin Exam: skin normal and skin intact. No dry skin, no warmth, no erythema, no maceration, normal color, no pre-ulcer, no ulcer and no callus.     Toe Exam: ROM and strength within normal limits. No left toe deformity.     Sensory   Monofilament testing: intact    Vascular  Capillary refills: < 3 seconds  The left DP pulse is 2+.     Assign Risk Category  No deformity present  No loss of protective sensation  No weak pulses  Risk: 0

## 2025-03-28 DIAGNOSIS — I10 HYPERTENSION, UNSPECIFIED TYPE: ICD-10-CM

## 2025-03-28 DIAGNOSIS — E78.5 HYPERLIPIDEMIA, UNSPECIFIED HYPERLIPIDEMIA TYPE: ICD-10-CM

## 2025-03-28 RX ORDER — AMLODIPINE BESYLATE 10 MG/1
10 TABLET ORAL DAILY
Qty: 90 TABLET | Refills: 1 | Status: SHIPPED | OUTPATIENT
Start: 2025-03-28

## 2025-03-28 RX ORDER — AMLODIPINE BESYLATE 10 MG/1
10 TABLET ORAL DAILY
Qty: 90 TABLET | Refills: 1 | Status: SHIPPED | OUTPATIENT
Start: 2025-03-28 | End: 2025-03-28 | Stop reason: SDUPTHER

## 2025-03-28 RX ORDER — SIMVASTATIN 20 MG
20 TABLET ORAL
Qty: 30 TABLET | Refills: 0 | Status: SHIPPED | OUTPATIENT
Start: 2025-03-28

## 2025-03-28 NOTE — TELEPHONE ENCOUNTER
Reason for call:   [x] Refill   [] Prior Auth  [x] Other: Not a Duplicate - Pharmacy states that they did not receive script    Office:   [x] PCP/Provider -  [facility] - Owen Siddiqui,    [] Specialty/Provider -     Medication: amLODIPine (NORVASC) 10 mg tablet     Dose/Frequency: Take 1 tablet by mouth once daily     Quantity: 90 tablet     Medication:  simvastatin (ZOCOR) 20 mg tablet    Dose/Frequency: Take 1 tablet (20 mg total) by mouth daily at bedtime     Quantity: 90 tablet     Pharmacy: [facility] [address] [place] [address] Drive [phone]    Local Pharmacy   Does the patient have enough for 3 days?   [x] Yes   [] No - Send as HP to POD    Mail Away Pharmacy   Does the patient have enough for 10 days?   [] Yes   [] No - Send as HP to POD

## 2025-04-14 DIAGNOSIS — I10 ESSENTIAL HYPERTENSION: ICD-10-CM

## 2025-04-14 DIAGNOSIS — N18.32 CHRONIC KIDNEY DISEASE (CKD) STAGE G3B/A1, MODERATELY DECREASED GLOMERULAR FILTRATION RATE (GFR) BETWEEN 30-44 ML/MIN/1.73 SQUARE METER AND ALBUMINURIA CREATININE RATIO LESS THAN 30 MG/G (HCC): ICD-10-CM

## 2025-04-14 NOTE — TELEPHONE ENCOUNTER
Reason for call:   [x] Refill   [] Prior Auth  [] Other:     Office:   [x] PCP/Provider - Naren  [] Specialty/Provider -     Medication:   Lisinopril 20 mg, 1 qd, 90    Pharmacy:   [facility]   Does the patient have enough for 3 days?   [x] Yes   [] No - Send as HP to POD    Mail Away Pharmacy   Does the patient have enough for 10 days?   [] Yes   [] No - Send as HP to POD

## 2025-04-15 RX ORDER — LISINOPRIL 20 MG/1
20 TABLET ORAL DAILY
Qty: 30 TABLET | Refills: 0 | Status: SHIPPED | OUTPATIENT
Start: 2025-04-15

## 2025-05-05 DIAGNOSIS — E78.5 HYPERLIPIDEMIA, UNSPECIFIED HYPERLIPIDEMIA TYPE: ICD-10-CM

## 2025-05-05 RX ORDER — SIMVASTATIN 20 MG
20 TABLET ORAL
Qty: 90 TABLET | Refills: 1 | Status: SHIPPED | OUTPATIENT
Start: 2025-05-05

## 2025-05-05 NOTE — TELEPHONE ENCOUNTER
Reason for call:   [x] Refill   [] Prior Auth  [] Other:     Office:   [x] PCP/Provider - Owen Siddiqui, DO   [] Specialty/Provider -     Medication:     simvastatin (ZOCOR) 20 mg tablet       Dose/Frequency:     20 mg, Oral, Daily at bedtime       Quantity: 90    Pharmacy:  [facility] [address] [place], [place] [id number]     Local Pharmacy   Does the patient have enough for 3 days?   [] Yes   [x] No - Send as HP to POD    Mail Away Pharmacy   Does the patient have enough for 10 days?   [] Yes   [] No - Send as HP to POD

## 2025-05-19 DIAGNOSIS — I10 ESSENTIAL HYPERTENSION: ICD-10-CM

## 2025-05-19 DIAGNOSIS — N18.32 CHRONIC KIDNEY DISEASE (CKD) STAGE G3B/A1, MODERATELY DECREASED GLOMERULAR FILTRATION RATE (GFR) BETWEEN 30-44 ML/MIN/1.73 SQUARE METER AND ALBUMINURIA CREATININE RATIO LESS THAN 30 MG/G (HCC): ICD-10-CM

## 2025-05-19 RX ORDER — LISINOPRIL 20 MG/1
20 TABLET ORAL DAILY
Qty: 90 TABLET | Refills: 1 | Status: SHIPPED | OUTPATIENT
Start: 2025-05-19

## 2025-05-19 NOTE — TELEPHONE ENCOUNTER
Reason for call:   [x] Refill   [] Prior Auth  [] Other:     Office:   [x] PCP/Provider - Owen Siddiqui, DO   [] Specialty/Provider -     Medication: lisinopril 20 mg    Dose/Frequency: 1 tab daily     Quantity: 30 tabs    Pharmacy: [facility] [id number] [facility] [address]      Local Pharmacy   Does the patient have enough for 3 days?   [] Yes   [x] No - Send as HP to POD    Mail Away Pharmacy   Does the patient have enough for 10 days?   [] Yes   [] No - Send as HP to POD

## 2025-08-06 DIAGNOSIS — I10 ESSENTIAL HYPERTENSION: ICD-10-CM

## 2025-08-06 DIAGNOSIS — N18.32 CHRONIC KIDNEY DISEASE (CKD) STAGE G3B/A1, MODERATELY DECREASED GLOMERULAR FILTRATION RATE (GFR) BETWEEN 30-44 ML/MIN/1.73 SQUARE METER AND ALBUMINURIA CREATININE RATIO LESS THAN 30 MG/G (HCC): ICD-10-CM

## 2025-08-07 RX ORDER — DOXAZOSIN 2 MG/1
2 TABLET ORAL
Qty: 90 TABLET | Refills: 1 | Status: SHIPPED | OUTPATIENT
Start: 2025-08-07

## (undated) DEVICE — CHLORAPREP HI-LITE 26ML ORANGE

## (undated) DEVICE — STRL PENROSE DRAIN 18" X 1/4": Brand: CARDINAL HEALTH

## (undated) DEVICE — GLOVE SRG BIOGEL 7

## (undated) DEVICE — SUT MONOCRYL 4-0 PS-2 27 IN Y426H

## (undated) DEVICE — ADHESIVE SKIN HIGH VISCOSITY EXOFIN 1ML

## (undated) DEVICE — NEPTUNE E-SEP SMOKE EVACUATION PENCIL, COATED, 70MM BLADE, PUSH BUTTON SWITCH: Brand: NEPTUNE E-SEP

## (undated) DEVICE — SUT VICRYL 3-0 SH 27 IN J416H

## (undated) DEVICE — SPECIMEN CONTAINER STERILE PEEL PACK

## (undated) DEVICE — NEEDLE 25G X 1 1/2

## (undated) DEVICE — SUT VICRYL 3-0 REEL 54 IN J285G

## (undated) DEVICE — SYRINGE 10ML LL

## (undated) DEVICE — INTENDED FOR TISSUE SEPARATION, AND OTHER PROCEDURES THAT REQUIRE A SHARP SURGICAL BLADE TO PUNCTURE OR CUT.: Brand: BARD-PARKER ® CARBON RIB-BACK BLADES

## (undated) DEVICE — GLOVE SRG BIOGEL 7.5

## (undated) DEVICE — BETHLEHEM UNIVERSAL MINOR GEN: Brand: CARDINAL HEALTH

## (undated) DEVICE — GLOVE INDICATOR UNDERGLOVE SZ 7.5 GREEN

## (undated) DEVICE — ANTIBACTERIAL VIOLET BRAIDED (POLYGLACTIN 910), SYNTHETIC ABSORBABLE SUTURE: Brand: COATED VICRYL

## (undated) DEVICE — SUT VICRYL 2-0 CP-1 27 IN J266H

## (undated) DEVICE — POOLE SUCTION HANDLE W/TUBING: Brand: CARDINAL HEALTH